# Patient Record
Sex: FEMALE | Race: WHITE | URBAN - METROPOLITAN AREA
[De-identification: names, ages, dates, MRNs, and addresses within clinical notes are randomized per-mention and may not be internally consistent; named-entity substitution may affect disease eponyms.]

---

## 2017-02-01 NOTE — PROGRESS NOTES
PRE-VISIT PLANNING:     Future Appointments  Date Time Provider Prasanna Yaz   2017 9:00 AM Kwabena Presley MD Kimberly Moreno is a patient of Kwabena Presley MD who is scheduled for an office visit with Kwabena Presley MD on 2017 for follow up on discoid lupus, prediabetes and high TGs (labs are due). Encounter opened prior to visit to complete pre-visit planning, update and review pertinent sections in the chart. Last office visit with PCP was 16. Rooming Nurse Items:  -- Offer flu shot    Pending items for provider to review with patient:  -- Update fasting labs  There are no preventive care reminders to display for this patient. Internal Medicine/Primary Care  Progress Note  [address] Garay at [address] , [address] [place] [address]  Phone [phone]  Fax [phone]    Date of Service:  2017  Patient's Name: Liam Loredo   Patient's :  1980     History, Discussion & Plan     Chief Complaint   Patient presents with    Medication Evaluation        Liam Loredo is a pleasant 40 y.o. female patient who was seen today for follow up visit. She  has a past medical history of Allergic rhinitis due to dogs; Allergic rhinitis due to pollen (2010); BMI 28.0-28.9,adult; Discoid lupus; Herpes zoster with ophthalmic complication; History of gestational hypertension (); Hypertriglyceridemia (); and Prediabetes. Discoid lupus is stable, usually better in the winter, worse with sun exposures. She is careful to apply sunblock even in the winter. She has had a few lesions on forearms and face, but all are healing at this point with use of topicals. Dr. Raya Sepulveda had recommended DUYEN screening every 6 months and derm follow up if DUYEN becomes positive. Labs on 16 with stable A1C at 5.8% and DUYEN negative. Fasting today to update labs.       Migraines are improved with Mirena, placed in Sept by Ob/Gyn at [facility]. Still has some bleeding cycles, alternating between spotting and 10 day bleeding cycles. Seeing Dr. Maryam Gee at [facility] for viral infection in right eye. Her kids (now 8, 5 and 3) have been sick repeatedly with the weather changes - mostly URI, one did have stomach bug. She hasn't been ill at all. Declines flu shot. Review of Systems (positives in bold)   CONST:   fevers, chills, rigors, malaise, night sweats, fatigue    unintentional weight change, appetite change  NEURO:   headaches, auras, vision changes, seizures,     dizziness, lightheadeness, orthostasis, loss of consciousness, confusion    numbness/tingling/sensory change, focal weakness, new gait difficulty/imbalance  HEENT:  itchy eyes, runny eyes, red eyes, eye watering or discharge,     vision changes, light sensitivity, double vision  CV:      chest pain, palpitations, chest wall pain, orthopnea, PND, edema  PULM:  SOB, wheezing, cough, sputum production, hemoptysis  GI:             nausea, vomiting, dry heaves, abdominal pain,     diarrhea, constipation, greasy stools, blood in stool, pale stools  :       dysuria, frequency, urgency, hematuria, incontinence, flank pain     decreased urine output, dark urine color, malodorous urine, kidney stones  MS:      focal muscle pain, myalgias, soft tissue swelling,    focal joint pain, diffuse joint aches, joint swelling/redness,     decreased ROM, joint instability, joint crepitus    neck pain, back pain  SKIN:        rashes, itching, vesicles, pustules, drainage, cuts or sores, nonhealing wounds,     acne, rosacea, new or changing skin growths, skin tags, warts    Body mass index is 28.96 kg/(m^2). Discussed the patient's above normal BMI with her. I have recommended the following interventions and follow up:  Weight loss from baseline weight.   30 minutes of cardiovascular exercise daily, reduction in simple carbohydrates, increase in dietary fiber, adequate water intake to stay hydrated/keep urine clear to light yellow. Follow up at next OV. Diagnoses & Orders:   Annamaria Sullivan was seen today for follow up. Update fasting labs. Monitor for worsening skin lesions, new joint aches/pains or swelling, breathing issues, urinary symptoms/change, leg swelling. Patient to let me know if she needs any topicals refilled for discoid lupus. Follow up in 6 months for 30 min visit, sooner PRN. ICD-10-CM ICD-9-CM    1. Discoid lupus L93.0 695.4 DUYEN QL, W/REFLEX CASCADE   2. Prediabetes R73.03 790.29 HEMOGLOBIN A1C WITH EAG   3. Hypertriglyceridemia E78.1 272.1 LIPID PANEL   4. Screening for endocrine, nutritional, metabolic and immunity disorder Z13.29 V77.99 DUYEN QL, W/REFLEX CASCADE    Z13.21  CBC WITH AUTOMATED DIFF    K37.533  METABOLIC PANEL, COMPREHENSIVE    Z13.0  HEMOGLOBIN A1C WITH EAG      LIPID PANEL      URINALYSIS W/ RFLX MICROSCOPIC      TSH AND FREE T4      VITAMIN D, 25 HYDROXY   5. Screening for diabetes mellitus Z13.1 V77.1 HEMOGLOBIN A1C WITH EAG   6. Screening for ischemic heart disease Z13.6 V81.0 LIPID PANEL   7. Screening for lipid disorders Z13.220 V77.91 LIPID PANEL   8. Screening for blood or protein in urine Z13.89 V82.9 URINALYSIS W/ RFLX MICROSCOPIC   9. Thyroid disorder screen Z13.29 V77.0 TSH AND FREE T4   10. Encounter for vitamin deficiency screening Z13.21 V77.99 VITAMIN D, 25 HYDROXY   11. Influenza vaccination declined by patient Z28.21 V64.06    12. Herpes zoster with ophthalmic complication, unspecified herpes zoster eye disease B02.30 053.29        The patient states understanding/agreement with the treatment plan. All questions were answered.       Ashley Guardado MD - Internal Medicine  2/2/2017, 5:52 PM    Patient Care Team:  Ashley Guardado MD as PCP - General (Internal Medicine)  [facility] as Consulting Provider (Obstetrics & Gynecology)  [facility] as Consulting Provider (Ophthalmology)  Cinthia Sam MD as Consulting Provider (Ophthalmology)      Objective:       VS:    Visit Vitals    /82    Pulse 84    Temp 98.6 °F (37 °C) (Oral)    Resp 16    Ht 5' 5\" (1.651 m)    Wt 174 lb (78.9 kg)    LMP 01/31/2017 (Exact Date)    SpO2 98%    BMI 28.96 kg/m2       General:   Age appropriate female, seated comfortably in exam room chair    Appears well, well-nourished, well-groomed, conversant, alert, in no acute distress. HEENT:  Normocephalic, atraumatic, MMM, PERRL, EOMI   Cardiovasc:   Regular rate and rhythm, no murmurs, no rubs, no gallops  Pulmonary:   Clear breath sounds bilaterally, good air movement,    No wheezing, no rales, no rhonchi, normal respiratory effort  Abdomen:   Abdomen soft, nontender, nondistended, NABS, no masses  Extremities:   No pitting dependent edema    No tenderness with palpation of calves    Warm and well-perfused at distal extremities  Neuro:   Alert, conversant, following commands, no focal deficits.      Gait is narrow based and stable without assistive device  Skin:    Erythematous plaque without ulceration or drainage dorsal hand and nummular areas on face and forearms bilaterally with mild erythema, slightly raised, no excoriation  Psych:  Affect is bright and interactive, facies and mood are congruent,     behavior normal and appropriate, thought process linear and logical     Memory appears to be normal throughout interview      Additional History     Past Medical History   Diagnosis Date    Allergic rhinitis due to dogs     Allergic rhinitis due to pollen 6/28/2010     Seen by ENT, using OTC claritin and nasal steroid spray PRN    BMI 28.0-28.9,adult     Discoid lupus      Dr[facility] Benigno [facility], skin on face, chest, scalp and arms - blood tests every 6 months    Herpes zoster with ophthalmic complication      Followed by Dr. Nestor Sanderson and referred to [facility] for corneal scarring    History of gestational hypertension 2013    Hypertriglyceridemia 2015    Prediabetes      HbA1C 5.9% (2/2016)     No past surgical history on file. Social History   Substance Use Topics    Smoking status: Never Smoker    Smokeless tobacco: Never Used    Alcohol use No     Current Outpatient Prescriptions   Medication Sig    valACYclovir (VALTREX) 500 mg tablet Take 1,000 mg by mouth three (3) times daily.  ganciclovir 0.15% (ZIRGAN) 0.15 % gel 1 Drop.  Difluprednate (DUREZOL) 0.05 % ophthalmic emulsion Administer 1 Drop to both eyes.  levonorgestrel (MIRENA) 20 mcg/24 hr (5 years) IUD 1 Each by IntraUTERine route once. Placed 9/1/16    ketoconazole (NIZORAL) 2 % topical cream Thin layer to affected areas on toes twice daily until skin clears. Then use antiperspirant.  clobetasol (TEMOVATE) 0.05 % ointment Apply  to affected area two (2) times a day.  mometasone (NASONEX) 50 mcg/actuation nasal spray 2 Sprays by Both Nostrils route daily. No current facility-administered medications for this visit. Allergies   Allergen Reactions    Sulfa (Sulfonamide Antibiotics) Other (comments)            This document may have been created with the aid of dictation software. In spite of review and editing, text may contain errors, particularly phonetic errors.

## 2017-02-02 ENCOUNTER — OFFICE VISIT (OUTPATIENT)
Dept: FAMILY MEDICINE CLINIC | Age: 37
End: 2017-02-02

## 2017-02-02 VITALS
SYSTOLIC BLOOD PRESSURE: 110 MMHG | TEMPERATURE: 98.6 F | RESPIRATION RATE: 16 BRPM | HEIGHT: 65 IN | HEART RATE: 84 BPM | WEIGHT: 174 LBS | BODY MASS INDEX: 28.99 KG/M2 | DIASTOLIC BLOOD PRESSURE: 82 MMHG | OXYGEN SATURATION: 98 %

## 2017-02-02 DIAGNOSIS — R73.03 PREDIABETES: Chronic | ICD-10-CM

## 2017-02-02 DIAGNOSIS — Z13.228 SCREENING FOR ENDOCRINE, NUTRITIONAL, METABOLIC AND IMMUNITY DISORDER: ICD-10-CM

## 2017-02-02 DIAGNOSIS — Z13.0 SCREENING FOR ENDOCRINE, NUTRITIONAL, METABOLIC AND IMMUNITY DISORDER: ICD-10-CM

## 2017-02-02 DIAGNOSIS — Z13.220 SCREENING FOR LIPID DISORDERS: ICD-10-CM

## 2017-02-02 DIAGNOSIS — L93.0 DISCOID LUPUS: Primary | Chronic | ICD-10-CM

## 2017-02-02 DIAGNOSIS — B02.30 HERPES ZOSTER WITH OPHTHALMIC COMPLICATION, UNSPECIFIED HERPES ZOSTER EYE DISEASE: ICD-10-CM

## 2017-02-02 DIAGNOSIS — Z13.21 ENCOUNTER FOR VITAMIN DEFICIENCY SCREENING: ICD-10-CM

## 2017-02-02 DIAGNOSIS — Z13.21 SCREENING FOR ENDOCRINE, NUTRITIONAL, METABOLIC AND IMMUNITY DISORDER: ICD-10-CM

## 2017-02-02 DIAGNOSIS — Z13.89 SCREENING FOR BLOOD OR PROTEIN IN URINE: ICD-10-CM

## 2017-02-02 DIAGNOSIS — E78.1 HYPERTRIGLYCERIDEMIA: Chronic | ICD-10-CM

## 2017-02-02 DIAGNOSIS — Z13.1 SCREENING FOR DIABETES MELLITUS: ICD-10-CM

## 2017-02-02 DIAGNOSIS — Z13.29 SCREENING FOR ENDOCRINE, NUTRITIONAL, METABOLIC AND IMMUNITY DISORDER: ICD-10-CM

## 2017-02-02 DIAGNOSIS — Z28.21 INFLUENZA VACCINATION DECLINED BY PATIENT: ICD-10-CM

## 2017-02-02 DIAGNOSIS — Z13.29 THYROID DISORDER SCREEN: ICD-10-CM

## 2017-02-02 DIAGNOSIS — Z13.6 SCREENING FOR ISCHEMIC HEART DISEASE: ICD-10-CM

## 2017-02-02 RX ORDER — VALACYCLOVIR HYDROCHLORIDE 500 MG/1
1000 TABLET, FILM COATED ORAL 3 TIMES DAILY
COMMUNITY

## 2017-02-02 RX ORDER — DIFLUPREDNATE 0.5 MG/ML
1 EMULSION OPHTHALMIC
COMMUNITY
End: 2017-07-13

## 2017-02-02 NOTE — PROGRESS NOTES
Liam Loredo is a 40 y.o. female  Pt here today for follow up visit. 1. Have you been to the ER, urgent care clinic since your last visit? Hospitalized since your last visit? No    2. Have you seen or consulted any other health care providers outside of the [address] [place] [address] since your last visit? Include any pap smears or colon screening.  Yes Where: OB/GYN

## 2017-02-02 NOTE — MR AVS SNAPSHOT
Visit Information Date & Time Provider Department Dept. Phone Encounter #  
 2/2/2017  9:00 AM Melinda Silva, [place] [phone] [id number] Follow-up Instructions Return in about 6 months (around 8/2/2017) for 30 min visit. Upcoming Health Maintenance Date Due  
 PAP AKA CERVICAL CYTOLOGY 6/5/2017 DTaP/Tdap/Td series (2 - Td) 4/14/2021 Allergies as of 2/2/2017  Review Complete On: 2/2/2017 By: Melinda Silva MD  
  
 Severity Noted Reaction Type Reactions Sulfa (Sulfonamide Antibiotics)  06/24/2010    Other (comments) Current Immunizations  Reviewed on 2/1/2016 Name Date Tdap 4/14/2011 Not reviewed this visit You Were Diagnosed With   
  
 Codes Comments Screening for endocrine, nutritional, metabolic and immunity disorder    -  Primary ICD-10-CM: Z13.29, Z13.21, Z13.228, Z13.0 ICD-9-CM: V77.99 Screening for diabetes mellitus     ICD-10-CM: Z13.1 ICD-9-CM: V77.1 Screening for ischemic heart disease     ICD-10-CM: Z13.6 ICD-9-CM: V81.0 Screening for lipid disorders     ICD-10-CM: Z13.220 ICD-9-CM: V77.91 Screening for blood or protein in urine     ICD-10-CM: Z13.89 ICD-9-CM: V82.9 Thyroid disorder screen     ICD-10-CM: Z13.29 ICD-9-CM: V77.0 Encounter for vitamin deficiency screening     ICD-10-CM: Z13.21 ICD-9-CM: V77.99 Prediabetes     ICD-10-CM: R73.03 
ICD-9-CM: 790.29 Discoid lupus     ICD-10-CM: L93.0 ICD-9-CM: 695.4 Hypertriglyceridemia     ICD-10-CM: E78.1 ICD-9-CM: 272.1 Vitals BP Pulse Temp Resp Height(growth percentile) Weight(growth percentile) 110/82 84 98.6 °F (37 °C) (Oral) 16 5' 5\" (1.651 m) 174 lb (78.9 kg) LMP SpO2 BMI OB Status Smoking Status 01/31/2017 (Exact Date) 98% 28.96 kg/m2 Having regular periods Never Smoker Vitals History BMI and BSA Data  Body Mass Index Body Surface Area  
 28.96 kg/m 2 1.9 m 2  
  
  
 Preferred Pharmacy Pharmacy Name Phone [facility] [address] [phone] Your Updated Medication List  
  
   
This list is accurate as of: 2/2/17  9:05 AM.  Always use your most recent med list.  
  
  
  
  
 clobetasol 0.05 % ointment Commonly known as:  Julian Sinks Apply  to affected area two (2) times a day. DUREZOL 0.05 % ophthalmic emulsion Generic drug:  Difluprednate Administer 1 Drop to both eyes. ketoconazole 2 % topical cream  
Commonly known as:  NIZORAL Thin layer to affected areas on toes twice daily until skin clears. Then use antiperspirant. MIRENA 20 mcg/24 hr (5 years) IUD Generic drug:  levonorgestrel 1 Each by IntraUTERine route once. Placed 9/1/16  
  
 mometasone 50 mcg/actuation nasal spray Commonly known as:  NASONEX  
2 Sprays by Both Nostrils route daily. VALTREX 500 mg tablet Generic drug:  valACYclovir Take 1,000 mg by mouth three (3) times daily. ZIRGAN 0.15 % Gel Generic drug:  ganciclovir 0.15% 1 Drop. Follow-up Instructions Return in about 6 months (around 8/2/2017) for 30 min visit. To-Do List   
 02/02/2017 Lab:  DUYEN QL, W/REFLEX CASCADE   
  
 02/02/2017 Lab:  CBC WITH AUTOMATED DIFF   
  
 02/02/2017 Lab:  HEMOGLOBIN A1C WITH EAG   
  
 02/02/2017 Lab:  LIPID PANEL   
  
 02/02/2017 Lab:  METABOLIC PANEL, COMPREHENSIVE   
  
 02/02/2017 Lab:  TSH AND FREE T4   
  
 02/02/2017 Lab:  URINALYSIS W/ RFLX MICROSCOPIC   
  
 02/02/2017 Lab:  VITAMIN D, 25 HYDROXY Patient Instructions STAY UP TO DATE WITH YOUR PREVENTIVE HEALTH:    
Please review the following recommendations and if you are not sure that your healthcare is up to date, ask your provider at your next scheduled office visit.   
 
-- Yearly preventive visits (sometimes called \"physicals\") are recommended for all adults. Medicare and Medicaid do not pay for physicals. Medicare covers a yearly wellness visit that differs in many ways from a traditional physical, but is an opportunity to make sure you are maximizing the preventive services that will be covered by Medicare. Each insurance carrier will have different regulations about what services may be covered, so be sure to talk with your insurance provider before scheduling a visit. -- Colon cancer screening is recommended for all patients 48 and older with either colonoscopy (interval will vary depending on results) or yearly stool testing.   
 
-- Mammogram is recommended every 2 years for women ages 36 to 76. -- Pap smear is recommended every 3-5 years for women aged 24 to 72, unless your cervix has been surgically removed for benign reasons. -- Flu shot is recommended every fall for adults of all ages. -- Prevnar 15 is recommended at the age of 72 for all adults. -- Pneumovax is recommended one year after Prevnar 13 for all adults, but earlier if you have a history of chronic health problems (including diabetes and asthma) or smoking. -- Tetanus boosters are recommended every 10 years for adults of all ages. Medicare and Medicaid do not cover tetanus as a preventive booster, but will pay for patients to receive a booster in the event of certain injuries. INSTRUCTIONS AFTER YOUR VISIT ON 2/2/2017 LAB WORK was ordered for today. Sign in for the lab at the . Results are generally reviewed within 10-14 days. LAB RESULTS can be obtained by logging into your [employer] account. If you need assistance with accessing your account, you can call the [address] [place] [address] at #[phone]. TESTING RESULTS  
-- Lab results may become available for your review on [employer] before your provider has an opportunity to write you a note about results.   Review of routine lab results will generally be done in 10-14 days. Please save your inquiries about results until your provider has had time to review them. -- If you have testing done outside of the office, please call to let us know the date and location that your testing was completed. Results can often be obtained faster if an inquiry is run. MEDICATION REFILLS   
 
-- Controlled substance refills (medications that require printed prescriptions for monitoring of use per [facility] guidelines) must be picked up in the office and per medical group policy will require a scheduled office visit with the provider. Calling the after hours answering service to request a controlled substance represents a violation of [facility] controlled substance policy. -- All other medication refills are to be requested by calling your pharmacy during regular office hours. The after hours physician on call is not required to respond to calls about medication refills. It is your responsibility to keep track of when you may be running out of medication and to place refill requests at least 3 business days prior. [facility] Scheduling appointments can be done at the  or by calling [phone] and selecting option #1. HOLIDAY CLOSURES:  
 
The office is closed on six major holidays each year:  New Year's Day, July 4th, Memorial Day, Labor Day, Thanksgiving, and Ani Day. Lab and X-ray services are not available on those days. Introducing [facility]! Kay Veloz introduces [employer] patient portal. Now you can access parts of your medical record, email your doctor's office, and request medication refills online. 1. In your internet browser, go to [email or web address] 2. Click on the First Time User? Click Here link in the Sign In box. You will see the New Member Sign Up page. 3. Enter your [employer] Access Code exactly as it appears below. You will not need to use this code after youve completed the sign-up process. If you do not sign up before the expiration date, you must request a new code. · [employer] Access Code: [email or web address] Expires: 5/3/2017  9:05 AM 
 
4. Enter the last four digits of your Social Security Number (xxxx) and Date of Birth (mm/dd/yyyy) as indicated and click Submit. You will be taken to the next sign-up page. 5. Create a Openfolio ID. This will be your [employer] login ID and cannot be changed, so think of one that is secure and easy to remember. 6. Create a [employer] password. You can change your password at any time. 7. Enter your Password Reset Question and Answer. This can be used at a later time if you forget your password. 8. Enter your e-mail address. You will receive e-mail notification when new information is available in [address] 9. Click Sign Up. You can now view and download portions of your medical record. 10. Click the Download Summary menu link to download a portable copy of your medical information. If you have questions, please visit the Frequently Asked Questions section of the [employer] website. Remember, [employer] is NOT to be used for urgent needs. For medical emergencies, dial 911. Now available from your iPhone and Android! Please provide this summary of care documentation to your next provider. Your primary care clinician is listed as Alber Dominguez. If you have any questions after today's visit, please call [phone].

## 2017-02-02 NOTE — LETTER
2/15/2017 10:39 AM 
 
Ms. Yuly Black 
[address] [address] Dear Yuly Black: 
 
Please find your most recent results below. Resulted Orders CBC WITH AUTOMATED DIFF Result Value Ref Range WBC 4.6 4.0 - 11.0 K/uL  
 RBC 4.55 3.80 - 5.20 M/uL  
 HGB 13.8 11.7 - 15.5 g/dL HCT 40.7 35.1 - 46.5 % MCV 90 80 - 95 fL  
 MCH 30 26 - 34 pg MCHC 34 32 - 36 g/dL  
 RDW 13.9 10.0 - 16.0 % PLATELET 805 688 - 765 K/uL MPV 10.2 6.0 - 10.8 fL  
 NEUTROPHILS 42 40 - 75 % Lymphocytes 41 27 - 45 % MONOCYTES 14 (H) 3 - 9 % EOSINOPHILS 3 0 - 6 % BASOPHILS 1 0 - 2 %  
 ABS. NEUTROPHILS 1.9 1.8 - 7.7 K/uL ABSOLUTE LYMPHOCYTE COUNT 1.9 1.0 - 4.8 K/uL  
 ABS. MONOCYTES 0.6 0.1 - 0.9 K/uL  
 ABS. EOSINOPHILS 0.1 0.0 - 0.5 K/uL  
 ABS. BASOPHILS 0.0 0.0 - 0.2 K/uL Narrative Unless additionally indicated, test performed at: [facility] [address] [facility], [address] [place] [address], [address] [place] [address]. PH: [phone]. METABOLIC PANEL, COMPREHENSIVE Result Value Ref Range Glucose 99 65 - 99 mg/dL BUN 9 6 - 22 mg/dL Creatinine 0.5 0.5 - 1.2 mg/dL Sodium 139 133 - 145 mmol/L Potassium 3.8 3.5 - 5.5 mmol/L Chloride 101 98 - 110 mmol/L  
 CO2 23 20 - 32 mmol/L  
 AST (SGOT) 9 (L) 10 - 37 U/L  
 ALT (SGPT) 18 5 - 40 U/L Alk. phosphatase 62 25 - 115 U/L Bilirubin, total 0.4 0.2 - 1.2 mg/dL Calcium 8.7 8.4 - 10.5 mg/dL Protein, total 7.4 6.4 - 8.3 g/dL Albumin 4.7 3.5 - 5.0 g/dL A-G Ratio 1.7 1.1 - 2.6 ratio Globulin 2.7 2.0 - 4.0 g/dL Anion gap 15.0 mmol/L Comment:  
   Test includes Albumin, Alkaline Phosphatase, ALT, AST, BUN, Calcium, CO2, Chloride, Creatinine, Glucose, Potassium, Sodium, Total Bilirubin and Total 
Protein. Estimated GFR results are reported in mL/min/1.73 sq.m. by the MDRD equation. This eGFR is validated for stable chronic renal failure patients. This  
equation is unreliable in acute illness or patients with normal renal function. GFRAA >60.0 >60.0 GFRNA >60.0 >60.0 Narrative Unless additionally indicated, test performed at: [address], [address] [place] [address]. PH: [phone]. LIPID PANEL Result Value Ref Range Triglyceride 146 40 - 149 mg/dL HDL Cholesterol 37 (L) 40 - 59 mg/dL Cholesterol, total 156 110 - 200 mg/dL LDL, calculated 90 50 - 99 mg/dL VLDL, calculated 29 8 - 30 mg/dL Comment:  
   Test includes cholesterol, HDL cholesterol, triglycerides and LDL. Cholesterol Recommended NCEP guidelines in mg/dL: 
Less than 200      Desirable 200 - 239          Borderline High Greater than or  = to 240   High 
  
 Narrative Unless additionally indicated, test performed at: [address], [address] [place] [address]. PH: [phone]. VITAMIN D, 25 HYDROXY Result Value Ref Range VITAMIN D, 25-HYDROXY 24.7 (L) 32.0 - 100.0 ng/mL Narrative Unless additionally indicated, test performed at: [address], [address] [place] [address]. PH: [phone]. T4, FREE Result Value Ref Range T4, Free 1.3 0.9 - 1.8 ng/dL Narrative Unless additionally indicated, test performed at: [address], [address] [place] [address]. PH: [phone]. DUYEN BY MULTIPLEX FLOW IA, QL Result Value Ref Range DUYEN SCREEN Negative Negative Narrative Unless additionally indicated, test performed at: [address], [address] [place] [address]. PH: [phone]. TSH, 3RD GENERATION Result Value Ref Range TSH 1.25 0.27 - 4.20 mcU/mL Narrative Unless additionally indicated, test performed at: [address], [address] [place] [address]. PH: [phone]. HEMOGLOBIN A1C W/O EAGSTIMATION Result Value Ref Range Hemoglobin A1c 5.8 4.8 - 5.9 % AVG  91 - 123 mg/dL Narrative Unless additionally indicated, test performed at: [email or web address], [address] [place] [address]. PH: [phone]. URINALYSIS W/MICROSCOPIC Result Value Ref Range Specific Gravity 1.024 1.005 - 1.03  
 pH (UA) 5.0 5.0 - 8.0 pH Protein Negative Negative, mg/dL Glucose Negative Negative mg/dL Ketone Negative Negative mg/dL Bilirubin Negative Negative Blood Negative Negative Nitrites Negative Negative Leukocyte Esterase Negative Negative Urobilinogen <2.0 <2.0 mg/dL WBC 0-2 0 - 2 /hpf  
 RBC Negative Negative, /hpf Epithelial cells 0-2 0 - 2 /hpf Narrative Unless additionally indicated, test performed at: [facility] [address] [facility], [address] [place] [address]. PH: [phone]. RECOMMENDATIONS per Dr. Armand Reid: 
Theadore Deshpande show normal blood counts, normal kidney testing, normal electrolytes, normal liver markers, normal fasting blood sugar. Cholesterol levels show normal LDL \"bad\" cholesterol and slightly low HDL \"good\" cholesterol.  This is similar to previous check.  Your triglyceride level however is significantly improved and is now in the normal range.    
Vitamin D level was low at 24.7, weekly high dose supplement is recommended. Thyroid testing was normal.  
DUYEN was negative. Hemoglobin A1C was stable at 5.8%, in early prediabetic range.    
Urine screening is normal  
 
Please call me if you have any questions: [phone] Sincerely, 
 
 
 
Nurse Erich Lennon

## 2017-02-02 NOTE — PATIENT INSTRUCTIONS
STAY UP TO DATE WITH YOUR PREVENTIVE HEALTH:     Please review the following recommendations and if you are not sure that your healthcare is up to date, ask your provider at your next scheduled office visit. -- Yearly preventive visits (sometimes called \"physicals\") are recommended for all adults. Medicare and Medicaid do not pay for physicals. Medicare covers a yearly wellness visit that differs in many ways from a traditional physical, but is an opportunity to make sure you are maximizing the preventive services that will be covered by Medicare. Each insurance carrier will have different regulations about what services may be covered, so be sure to talk with your insurance provider before scheduling a visit. -- Colon cancer screening is recommended for all patients 48 and older with either colonoscopy (interval will vary depending on results) or yearly stool testing.      -- Mammogram is recommended every 2 years for women ages 36 to 76. -- Pap smear is recommended every 3-5 years for women aged 24 to 72, unless your cervix has been surgically removed for benign reasons. -- Flu shot is recommended every fall for adults of all ages. -- Prevnar 15 is recommended at the age of 72 for all adults. -- Pneumovax is recommended one year after Prevnar 13 for all adults, but earlier if you have a history of chronic health problems (including diabetes and asthma) or smoking. -- Tetanus boosters are recommended every 10 years for adults of all ages. Medicare and Medicaid do not cover tetanus as a preventive booster, but will pay for patients to receive a booster in the event of certain injuries. INSTRUCTIONS AFTER YOUR VISIT ON 2/2/2017     LAB WORK was ordered for today. Sign in for the lab at the . Results are generally reviewed within 10-14 days. LAB RESULTS can be obtained by logging into your myThings account.   If you need assistance with accessing your account, you can call the [address] [place] [address] at #[phone]. TESTING RESULTS   -- Lab results may become available for your review on [facility]elloYello before your provider has an opportunity to write you a note about results. Review of routine lab results will generally be done in 10-14 days. Please save your inquiries about results until your provider has had time to review them. -- If you have testing done outside of the office, please call to let us know the date and location that your testing was completed. Results can often be obtained faster if an inquiry is run. MEDICATION REFILLS      -- Controlled substance refills (medications that require printed prescriptions for monitoring of use per [facility] guidelines) must be picked up in the office and per medical group policy will require a scheduled office visit with the provider. Calling the after hours answering service to request a controlled substance represents a violation of [facility] controlled substance policy. -- All other medication refills are to be requested by calling your pharmacy during regular office hours. The after hours physician on call is not required to respond to calls about medication refills. It is your responsibility to keep track of when you may be running out of medication and to place refill requests at least 3 business days prior. [facility]      Scheduling appointments can be done at the  or by calling [phone] and selecting option #1. HOLIDAY CLOSURES:     The office is closed on six major holidays each year:  New Year's Day, July 4th, Memorial Day, Labor Day, Thanksgiving, and Ani Day. Lab and X-ray services are not available on those days.

## 2017-02-03 LAB
25(OH)D3 SERPL-MCNC: 24.7 NG/ML (ref 32–100)
A-G RATIO,AGRAT: 1.7 RATIO (ref 1.1–2.6)
ABSOLUTE LYMPHOCYTE COUNT, 10803: 1.9 K/UL (ref 1–4.8)
ALBUMIN SERPL-MCNC: 4.7 G/DL (ref 3.5–5)
ALP SERPL-CCNC: 62 U/L (ref 25–115)
ALT SERPL-CCNC: 18 U/L (ref 5–40)
ANA SCREEN, 8017: NEGATIVE
ANION GAP SERPL CALC-SCNC: 15 MMOL/L
AST SERPL W P-5'-P-CCNC: 9 U/L (ref 10–37)
AVG GLU, 10930: 121 MG/DL (ref 91–123)
BASOPHILS # BLD: 0 K/UL (ref 0–0.2)
BASOPHILS NFR BLD: 1 % (ref 0–2)
BILIRUB SERPL-MCNC: 0.4 MG/DL (ref 0.2–1.2)
BILIRUB UR QL: NEGATIVE
BUN SERPL-MCNC: 9 MG/DL (ref 6–22)
CALCIUM SERPL-MCNC: 8.7 MG/DL (ref 8.4–10.5)
CHLORIDE SERPL-SCNC: 101 MMOL/L (ref 98–110)
CHOLEST SERPL-MCNC: 156 MG/DL (ref 110–200)
CO2 SERPL-SCNC: 23 MMOL/L (ref 20–32)
CREAT SERPL-MCNC: 0.5 MG/DL (ref 0.5–1.2)
EOSINOPHIL # BLD: 0.1 K/UL (ref 0–0.5)
EOSINOPHIL NFR BLD: 3 % (ref 0–6)
EPITHELIAL,EPSU: NORMAL /HPF (ref 0–2)
ERYTHROCYTE [DISTWIDTH] IN BLOOD BY AUTOMATED COUNT: 13.9 % (ref 10–16)
GFRAA, 66117: >60
GFRNA, 66118: >60
GLOBULIN,GLOB: 2.7 G/DL (ref 2–4)
GLUCOSE SERPL-MCNC: 99 MG/DL (ref 65–99)
GLUCOSE UR QL: NEGATIVE MG/DL
GRANULOCYTES,GRANS: 42 % (ref 40–75)
HBA1C MFR BLD HPLC: 5.8 % (ref 4.8–5.9)
HCT VFR BLD AUTO: 40.7 % (ref 35.1–46.5)
HDLC SERPL-MCNC: 37 MG/DL (ref 40–59)
HGB BLD-MCNC: 13.8 G/DL (ref 11.7–15.5)
HGB UR QL STRIP: NEGATIVE
KETONES UR QL STRIP.AUTO: NEGATIVE MG/DL
LDLC SERPL CALC-MCNC: 90 MG/DL (ref 50–99)
LEUKOCYTE ESTERASE: NEGATIVE
LYMPHOCYTES, LYMLT: 41 % (ref 27–45)
MCH RBC QN AUTO: 30 PG (ref 26–34)
MCHC RBC AUTO-ENTMCNC: 34 G/DL (ref 32–36)
MCV RBC AUTO: 90 FL (ref 80–95)
MONOCYTES # BLD: 0.6 K/UL (ref 0.1–0.9)
MONOCYTES NFR BLD: 14 % (ref 3–9)
NEUTROPHILS # BLD AUTO: 1.9 K/UL (ref 1.8–7.7)
NITRITE UR QL STRIP.AUTO: NEGATIVE
PH UR STRIP: 5 PH (ref 5–8)
PLATELET # BLD AUTO: 247 K/UL (ref 140–440)
PMV BLD AUTO: 10.2 FL (ref 6–10.8)
POTASSIUM SERPL-SCNC: 3.8 MMOL/L (ref 3.5–5.5)
PROT SERPL-MCNC: 7.4 G/DL (ref 6.4–8.3)
PROT UR QL STRIP: NEGATIVE MG/DL
RBC # BLD AUTO: 4.55 M/UL (ref 3.8–5.2)
RBC #/AREA URNS HPF: NEGATIVE /HPF
SODIUM SERPL-SCNC: 139 MMOL/L (ref 133–145)
SP GR UR: 1.02 (ref 1–1.03)
T4 FREE SERPL-MCNC: 1.3 NG/DL (ref 0.9–1.8)
TRIGL SERPL-MCNC: 146 MG/DL (ref 40–149)
TSH SERPL DL<=0.005 MIU/L-ACNC: 1.25 MCU/ML (ref 0.27–4.2)
UROBILINOGEN UR STRIP-MCNC: <2 MG/DL
VLDLC SERPL CALC-MCNC: 29 MG/DL (ref 8–30)
WBC # BLD AUTO: 4.6 K/UL (ref 4–11)
WBC URNS QL MICRO: NORMAL /HPF (ref 0–2)

## 2017-02-15 RX ORDER — ERGOCALCIFEROL 1.25 MG/1
50000 CAPSULE ORAL
Qty: 12 CAP | Refills: 1 | Status: SHIPPED | OUTPATIENT
Start: 2017-02-15 | End: 2017-08-10 | Stop reason: SDUPTHER

## 2017-05-09 DIAGNOSIS — B35.3 TINEA PEDIS OF BOTH FEET: ICD-10-CM

## 2017-05-10 RX ORDER — KETOCONAZOLE 20 MG/G
CREAM TOPICAL
Qty: 30 G | Refills: 0 | Status: SHIPPED | OUTPATIENT
Start: 2017-05-10 | End: 2017-07-13

## 2017-07-13 ENCOUNTER — OFFICE VISIT (OUTPATIENT)
Dept: FAMILY MEDICINE CLINIC | Age: 37
End: 2017-07-13

## 2017-07-13 VITALS
DIASTOLIC BLOOD PRESSURE: 80 MMHG | WEIGHT: 182 LBS | HEIGHT: 65 IN | OXYGEN SATURATION: 100 % | SYSTOLIC BLOOD PRESSURE: 130 MMHG | RESPIRATION RATE: 20 BRPM | TEMPERATURE: 97.6 F | HEART RATE: 98 BPM | BODY MASS INDEX: 30.32 KG/M2

## 2017-07-13 DIAGNOSIS — J30.9 ALLERGIC RHINITIS, UNSPECIFIED ALLERGIC RHINITIS TRIGGER, UNSPECIFIED RHINITIS SEASONALITY: ICD-10-CM

## 2017-07-13 DIAGNOSIS — L93.0 DISCOID LUPUS: ICD-10-CM

## 2017-07-13 DIAGNOSIS — E55.9 VITAMIN D DEFICIENCY: ICD-10-CM

## 2017-07-13 DIAGNOSIS — R73.03 PRE-DIABETES: ICD-10-CM

## 2017-07-13 DIAGNOSIS — L93.0 DISCOID LUPUS: Primary | ICD-10-CM

## 2017-07-13 RX ORDER — MOMETASONE FUROATE 50 UG/1
2 SPRAY, METERED NASAL DAILY
Qty: 1 CONTAINER | Refills: 0 | Status: CANCELLED | OUTPATIENT
Start: 2017-07-13

## 2017-07-13 RX ORDER — CLOBETASOL PROPIONATE 0.5 MG/G
CREAM TOPICAL
Qty: 15 G | Refills: 2 | Status: SHIPPED | OUTPATIENT
Start: 2017-07-13

## 2017-07-13 RX ORDER — CLOBETASOL PROPIONATE 0.5 MG/G
OINTMENT TOPICAL 2 TIMES DAILY
Qty: 30 G | Refills: 5 | Status: CANCELLED | OUTPATIENT
Start: 2017-07-13

## 2017-07-13 RX ORDER — MOMETASONE FUROATE 50 UG/1
2 SPRAY, METERED NASAL DAILY
Qty: 1 CONTAINER | Refills: 5 | Status: SHIPPED | OUTPATIENT
Start: 2017-07-13

## 2017-07-13 NOTE — PROGRESS NOTES
Rubina Oliveira is a 40 y.o. female  1. Have you been to the ER, urgent care clinic since your last visit? Hospitalized since your last visit? No    2. Have you seen or consulted any other health care providers outside of the [address] [place] [address] since your last visit? Include any pap smears or colon screening.  No

## 2017-07-13 NOTE — PROGRESS NOTES
HISTORY OF PRESENT ILLNESS  Ariel Martino is a 40 y.o. female. HPI  Discoid lupus, stable, she wants refill on her Clobex, but she prefer cream  AR, stable, need refill on Nasonex    Vit D def, improving, she has been taking 5000 iu weekly for 5 months now  Pre diabetes, stable, last a1c 5.8, glucose 99, but she gained few lbs since last visit  Review of Systems   Constitutional: Negative for chills and fever. HENT: Positive for congestion. Respiratory: Negative for cough and wheezing. Cardiovascular: Negative for chest pain. Gastrointestinal: Negative for abdominal pain and nausea. Musculoskeletal: Negative for joint pain and myalgias. Skin: Positive for rash. Physical Exam   Constitutional: She appears well-developed and well-nourished. HENT:   Right Ear: Tympanic membrane normal.   Left Ear: Tympanic membrane normal.   Nose: Mucosal edema present. Mouth/Throat: No oropharyngeal exudate. Cardiovascular: Normal rate, regular rhythm and normal heart sounds. Pulmonary/Chest: Effort normal and breath sounds normal.   Abdominal: Soft. There is no tenderness. Lymphadenopathy:     She has no cervical adenopathy. Skin: Rash (multiple round dry scaly erythematous patches on extr/trunck/face) noted. Vitals reviewed. ASSESSMENT and PLAN  Darron Muniz was seen today for lupus. Diagnoses and all orders for this visit:    Discoid lupus, stable  -     clobetasol (TEMOVATE) 0.05 % topical cream; Apply  to affected area two (2) times daily as needed. -     DUYEN QL, W/REFLEX CASCADE; Future    Allergic rhinitis, unspecified allergic rhinitis trigger, unspecified rhinitis seasonality, stable  -     mometasone (NASONEX) 50 mcg/actuation nasal spray; 2 Sprays by Both Nostrils route daily. Vitamin D deficiency, improving  -     VITAMIN D, 25 HYDROXY; Future    Pre-diabetes, stable, advised to exercise/lose weight  -     METABOLIC PANEL, COMPREHENSIVE;  Future  -     HEMOGLOBIN A1C WITH EAG; Future      Follow up in 6 mos with her pcp

## 2017-07-17 LAB
25(OH)D3 SERPL-MCNC: 33.7 NG/ML (ref 32–100)
A-G RATIO,AGRAT: 1.5 RATIO (ref 1.1–2.6)
ALBUMIN SERPL-MCNC: 4.3 G/DL (ref 3.5–5)
ALP SERPL-CCNC: 60 U/L (ref 25–115)
ALT SERPL-CCNC: 25 U/L (ref 5–40)
ANION GAP SERPL CALC-SCNC: 17 MMOL/L
AST SERPL W P-5'-P-CCNC: 10 U/L (ref 10–37)
AVG GLU, 10930: 133 MG/DL (ref 91–123)
BILIRUB SERPL-MCNC: 0.6 MG/DL (ref 0.2–1.2)
BUN SERPL-MCNC: 10 MG/DL (ref 6–22)
CALCIUM SERPL-MCNC: 8.9 MG/DL (ref 8.4–10.5)
CHLORIDE SERPL-SCNC: 96 MMOL/L (ref 98–110)
CO2 SERPL-SCNC: 23 MMOL/L (ref 20–32)
CREAT SERPL-MCNC: 0.5 MG/DL (ref 0.5–1.2)
GFRAA, 66117: >60
GFRNA, 66118: >60
GLOBULIN,GLOB: 2.8 G/DL (ref 2–4)
GLUCOSE SERPL-MCNC: 104 MG/DL (ref 65–99)
HBA1C MFR BLD HPLC: 6.2 % (ref 4.8–5.9)
MISCELLANEOUS TEST,99000: NORMAL
POTASSIUM SERPL-SCNC: 4.2 MMOL/L (ref 3.5–5.5)
PROT SERPL-MCNC: 7.1 G/DL (ref 6.4–8.3)
SENT TO, 434: NORMAL
SODIUM SERPL-SCNC: 136 MMOL/L (ref 133–145)
TEST NAME, 435: NORMAL

## 2017-07-19 NOTE — PROGRESS NOTES
Vit D normal, continue weekly vit D  DUYEN negative  A1c 6.2, worse than last labs, need to diet and lose weight

## 2017-11-08 ENCOUNTER — OFFICE VISIT (OUTPATIENT)
Dept: FAMILY MEDICINE CLINIC | Age: 37
End: 2017-11-08

## 2017-11-08 VITALS
SYSTOLIC BLOOD PRESSURE: 147 MMHG | WEIGHT: 185 LBS | HEART RATE: 108 BPM | RESPIRATION RATE: 18 BRPM | OXYGEN SATURATION: 100 % | HEIGHT: 65 IN | TEMPERATURE: 98.2 F | DIASTOLIC BLOOD PRESSURE: 82 MMHG | BODY MASS INDEX: 30.82 KG/M2

## 2017-11-08 DIAGNOSIS — J06.9 VIRAL UPPER RESPIRATORY TRACT INFECTION: ICD-10-CM

## 2017-11-08 DIAGNOSIS — J01.90 ACUTE NON-RECURRENT SINUSITIS, UNSPECIFIED LOCATION: ICD-10-CM

## 2017-11-08 DIAGNOSIS — Z23 ENCOUNTER FOR IMMUNIZATION: Primary | ICD-10-CM

## 2017-11-08 NOTE — PATIENT INSTRUCTIONS
Vaccine Information Statement    Influenza (Flu) Vaccine (Inactivated or Recombinant): What you need to know    Many Vaccine Information Statements are available in [place]iny[place] and other languages. See www.immunize.org/vis  Hojas de Información Sobre Vacunas están disponibles en Español y en muchos otros idiomas. Visite www.immunize.org/vis    1. Why get vaccinated? Influenza (flu) is a contagious disease that spreads around the [place] every year, usually between October and May. Flu is caused by influenza viruses, and is spread mainly by coughing, sneezing, and close contact. Anyone can get flu. Flu strikes suddenly and can last several days. Symptoms vary by age, but can include:   fever/chills   sore throat   muscle aches   fatigue   cough   headache    runny or stuffy nose    Flu can also lead to pneumonia and blood infections, and cause diarrhea and seizures in children. If you have a medical condition, such as heart or lung disease, flu can make it worse. Flu is more dangerous for some people. Infants and young children, people 72years of age and older, pregnant women, and people with certain health conditions or a weakened immune system are at greatest risk. Each year thousands of people in the [facility] die from flu, and many more are hospitalized. Flu vaccine can:   keep you from getting flu,   make flu less severe if you do get it, and   keep you from spreading flu to your family and other people. 2. Inactivated and recombinant flu vaccines    A dose of flu vaccine is recommended every flu season. Children 6 months through 6years of age may need two doses during the same flu season. Everyone else needs only one dose each flu season.        Some inactivated flu vaccines contain a very small amount of a mercury-based preservative called thimerosal. Studies have not shown thimerosal in vaccines to be harmful, but flu vaccines that do not contain thimerosal are available. There is no live flu virus in flu shots. They cannot cause the flu. There are many flu viruses, and they are always changing. Each year a new flu vaccine is made to protect against three or four viruses that are likely to cause disease in the upcoming flu season. But even when the vaccine doesnt exactly match these viruses, it may still provide some protection    Flu vaccine cannot prevent:   flu that is caused by a virus not covered by the vaccine, or   illnesses that look like flu but are not. It takes about 2 weeks for protection to develop after vaccination, and protection lasts through the flu season. 3. Some people should not get this vaccine    Tell the person who is giving you the vaccine:     If you have any severe, life-threatening allergies. If you ever had a life-threatening allergic reaction after a dose of flu vaccine, or have a severe allergy to any part of this vaccine, you may be advised not to get vaccinated. Most, but not all, types of flu vaccine contain a small amount of egg protein.  If you ever had Guillain-Barré Syndrome (also called GBS). Some people with a history of GBS should not get this vaccine. This should be discussed with your doctor.  If you are not feeling well. It is usually okay to get flu vaccine when you have a mild illness, but you might be asked to come back when you feel better. 4. Risks of a vaccine reaction    With any medicine, including vaccines, there is a chance of reactions. These are usually mild and go away on their own, but serious reactions are also possible. Most people who get a flu shot do not have any problems with it.      Minor problems following a flu shot include:    soreness, redness, or swelling where the shot was given     hoarseness   sore, red or itchy eyes   cough   fever   aches   headache   itching   fatigue  If these problems occur, they usually begin soon after the shot and last 1 or 2 days. More serious problems following a flu shot can include the following:     There may be a small increased risk of Guillain-Barré Syndrome (GBS) after inactivated flu vaccine. This risk has been estimated at 1 or 2 additional cases per million people vaccinated. This is much lower than the risk of severe complications from flu, which can be prevented by flu vaccine.  Young children who get the flu shot along with pneumococcal vaccine (PCV13) and/or DTaP vaccine at the same time might be slightly more likely to have a seizure caused by fever. Ask your doctor for more information. Tell your doctor if a child who is getting flu vaccine has ever had a seizure. Problems that could happen after any injected vaccine:      People sometimes faint after a medical procedure, including vaccination. Sitting or lying down for about 15 minutes can help prevent fainting, and injuries caused by a fall. Tell your doctor if you feel dizzy, or have vision changes or ringing in the ears.  Some people get severe pain in the shoulder and have difficulty moving the arm where a shot was given. This happens very rarely.  Any medication can cause a severe allergic reaction. Such reactions from a vaccine are very rare, estimated at about 1 in a million doses, and would happen within a few minutes to a few hours after the vaccination. As with any medicine, there is a very remote chance of a vaccine causing a serious injury or death. The safety of vaccines is always being monitored. For more information, visit: www.cdc.gov/vaccinesafety/    5. What if there is a serious reaction? What should I look for?  Look for anything that concerns you, such as signs of a severe allergic reaction, very high fever, or unusual behavior.     Signs of a severe allergic reaction can include hives, swelling of the face and throat, difficulty breathing, a fast heartbeat, dizziness, and weakness - usually within a few minutes to a few hours after the vaccination. What should I do?  If you think it is a severe allergic reaction or other emergency that cant wait, call 9-1-1 and get the person to the nearest hospital. Otherwise, call your doctor.  Reactions should be reported to the Vaccine Adverse Event Reporting System (VAERS). Your doctor should file this report, or you can do it yourself through  the VAERS web site at www.vaers. [facility].gov, or by calling 9-519.417.6146. VAERS does not give medical advice. 6. The National Vaccine Injury Compensation Program    The [facility] Vaccine Injury Compensation Program (VICP) is a federal program that was created to compensate people who may have been injured by certain vaccines. Persons who believe they may have been injured by a vaccine can learn about the program and about filing a claim by calling 6-180.710.2392 or visiting the [employer] website at www.[facility].gov/vaccinecompensation. There is a time limit to file a claim for compensation. 7. How can I learn more?  Ask your healthcare provider. He or she can give you the vaccine package insert or suggest other sources of information.  Call your local or state health department.  Contact the Centers for Disease Control and Prevention (CDC):  - Call 4-271.373.4312 (1-800-CDC-INFO) or  - Visit CDCs website at www.cdc.gov/flu    Vaccine Information Statement   Inactivated Influenza Vaccine   8/7/2015  [address] [id number]-[id number]    Department of Health and Human Services  Centers for Disease Control and Prevention    Office Use Only       Upper Respiratory Infection (Cold): Care Instructions  Your Care Instructions    An upper respiratory infection, or URI, is an infection of the nose, sinuses, or throat. URIs are spread by coughs, sneezes, and direct contact. The common cold is the most frequent kind of URI. The flu and sinus infections are other kinds of URIs. Almost all URIs are caused by viruses.  Antibiotics won't cure them. But you can treat most infections with home care. This may include drinking lots of fluids and taking over-the-counter pain medicine. You will probably feel better in 4 to 10 days. The doctor has checked you carefully, but problems can develop later. If you notice any problems or new symptoms, get medical treatment right away. Follow-up care is a key part of your treatment and safety. Be sure to make and go to all appointments, and call your doctor if you are having problems. It's also a good idea to know your test results and keep a list of the medicines you take. How can you care for yourself at home? · To prevent dehydration, drink plenty of fluids, enough so that your urine is light yellow or clear like water. Choose water and other caffeine-free clear liquids until you feel better. If you have kidney, heart, or liver disease and have to limit fluids, talk with your doctor before you increase the amount of fluids you drink. · Take an over-the-counter pain medicine, such as acetaminophen (Tylenol), ibuprofen (Advil, Motrin), or naproxen (Aleve). Read and follow all instructions on the label. · Before you use cough and cold medicines, check the label. These medicines may not be safe for young children or for people with certain health problems. · Be careful when taking over-the-counter cold or flu medicines and Tylenol at the same time. Many of these medicines have acetaminophen, which is Tylenol. Read the labels to make sure that you are not taking more than the recommended dose. Too much acetaminophen (Tylenol) can be harmful. · Get plenty of rest.  · Do not smoke or allow others to smoke around you. If you need help quitting, talk to your doctor about stop-smoking programs and medicines. These can increase your chances of quitting for good. When should you call for help? Call 911 anytime you think you may need emergency care. For example, call if:  ? · You have severe trouble breathing.    ?Call your doctor now or seek immediate medical care if:  ? · You seem to be getting much sicker. ? · You have new or worse trouble breathing. ? · You have a new or higher fever. ? · You have a new rash. ? Watch closely for changes in your health, and be sure to contact your doctor if:  ? · You have a new symptom, such as a sore throat, an earache, or sinus pain. ? · You cough more deeply or more often, especially if you notice more mucus or a change in the color of your mucus. ? · You do not get better as expected. Where can you learn more? Go to http://marky-jeovanny.info/. Enter X246 in the search box to learn more about \"Upper Respiratory Infection (Cold): Care Instructions. \"  Current as of: May 12, 2017  Content Version: 11.4  © 0241-0797 [employer]. Care instructions adapted under license by [employer] (which disclaims liability or warranty for this information). If you have questions about a medical condition or this instruction, always ask your healthcare professional. [address] any warranty or liability for your use of this information.

## 2017-11-08 NOTE — MR AVS SNAPSHOT
Visit Information Date & Time Provider Department Dept. Phone Encounter #  
 11/8/2017  3:30 PM Chu Shepherd [facility] [phone] [id number] Follow-up Instructions Return if symptoms worsen or fail to improve. Your Appointments 1/9/2018  9:30 AM  
Follow Up with Brittani Shannon MD  
[employer] [facility]) Appt Note: 6 month f/u  
 [address] [facility] [address] [facility] [id number] [phone]  
  
   
 [address] [facility] [place] Upcoming Health Maintenance Date Due  
 PAP AKA CERVICAL CYTOLOGY 6/5/2017 Influenza Age 5 to Adult 8/1/2017 DTaP/Tdap/Td series (2 - Td) 4/14/2021 Allergies as of 11/8/2017  Review Complete On: 11/8/2017 By: Chu Shepherd NP Severity Noted Reaction Type Reactions Sulfa (Sulfonamide Antibiotics)  06/24/2010    Other (comments) Current Immunizations  Reviewed on 11/8/2017 Name Date Influenza Vaccine (Quad) PF 11/8/2017  3:33 PM  
 Tdap 4/14/2011 Reviewed by Deacon Tomas LPN on 68/5/8918 at  3:33 PM  
You Were Diagnosed With   
  
 Codes Comments Encounter for immunization    -  Primary ICD-10-CM: C95 ICD-9-CM: V03.89 Acute non-recurrent sinusitis, unspecified location     ICD-10-CM: J01.90 ICD-9-CM: 461.9 Viral upper respiratory tract infection     ICD-10-CM: J06.9, B97.89 ICD-9-CM: 465.9 Vitals BP Pulse Temp Resp Height(growth percentile) Weight(growth percentile) 147/82 (BP 1 Location: Left arm, BP Patient Position: Sitting) (!) 108 98.2 °F (36.8 °C) (Oral) 18 5' 5\" (1.651 m) 185 lb (83.9 kg) LMP SpO2 BMI OB Status Smoking Status 10/18/2017 100% 30.79 kg/m2 Having regular periods Never Smoker BMI and BSA Data Body Mass Index Body Surface Area 30.79 kg/m 2 1.96 m 2 Preferred Pharmacy Pharmacy Name Phone [facility] [phone] Your Updated Medication List  
  
   
This list is accurate as of: 11/8/17  4:18 PM.  Always use your most recent med list.  
  
  
  
  
 clobetasol 0.05 % topical cream  
Commonly known as:  Alvester Car Apply  to affected area two (2) times daily as needed. ergocalciferol 50,000 unit capsule Commonly known as:  ERGOCALCIFEROL  
TAKE 1 CAP BY MOUTH EVERY SEVEN (7) DAYS  DAYS. MIRENA 20 mcg/24 hr (5 years) Iud  
Generic drug:  levonorgestrel 1 Each by IntraUTERine route once. Placed 9/1/16  
  
 mometasone 50 mcg/actuation nasal spray Commonly known as:  NASONEX  
2 Sprays by Both Nostrils route daily. VALTREX 500 mg tablet Generic drug:  valACYclovir Take 1,000 mg by mouth three (3) times daily. We Performed the Following INFLUENZA VIRUS VAC QUAD,SPLIT,PRESV FREE SYRINGE IM [id number] CPT(R)] MI IMMUNIZ ADMIN,1 SINGLE/COMB VAC/TOXOID [id number] CPT(R)] Follow-up Instructions Return if symptoms worsen or fail to improve. To-Do List   
 11/08/2017 Imaging:  XR SINUSES PARANASAL MIN 3 V Patient Instructions Vaccine Information Statement Influenza (Flu) Vaccine (Inactivated or Recombinant): What you need to know Many Vaccine Information Statements are available in Stateless and other languages. See www.immunize.org/vis Hojas de Información Sobre Vacunas están disponibles en Español y en muchos otros idiomas. Visite www.immunize.org/vis 1. Why get vaccinated? Influenza (flu) is a contagious disease that spreads around the [place] every year, usually between October and May. Flu is caused by influenza viruses, and is spread mainly by coughing, sneezing, and close contact. Anyone can get flu. Flu strikes suddenly and can last several days. Symptoms vary by age, but can include: 
 fever/chills  sore throat  muscle aches  fatigue  cough  headache  runny or stuffy nose Flu can also lead to pneumonia and blood infections, and cause diarrhea and seizures in children. If you have a medical condition, such as heart or lung disease, flu can make it worse. Flu is more dangerous for some people. Infants and young children, people 72years of age and older, pregnant women, and people with certain health conditions or a weakened immune system are at greatest risk. Each year thousands of people in the [facility] die from flu, and many more are hospitalized. Flu vaccine can: 
 keep you from getting flu, 
 make flu less severe if you do get it, and 
 keep you from spreading flu to your family and other people. 2. Inactivated and recombinant flu vaccines A dose of flu vaccine is recommended every flu season. Children 6 months through 6years of age may need two doses during the same flu season. Everyone else needs only one dose each flu season. Some inactivated flu vaccines contain a very small amount of a mercury-based preservative called thimerosal. Studies have not shown thimerosal in vaccines to be harmful, but flu vaccines that do not contain thimerosal are available. There is no live flu virus in flu shots. They cannot cause the flu. There are many flu viruses, and they are always changing. Each year a new flu vaccine is made to protect against three or four viruses that are likely to cause disease in the upcoming flu season. But even when the vaccine doesnt exactly match these viruses, it may still provide some protection Flu vaccine cannot prevent: 
 flu that is caused by a virus not covered by the vaccine, or 
 illnesses that look like flu but are not. It takes about 2 weeks for protection to develop after vaccination, and protection lasts through the flu season. 3. Some people should not get this vaccine Tell the person who is giving you the vaccine:  If you have any severe, life-threatening allergies. If you ever had a life-threatening allergic reaction after a dose of flu vaccine, or have a severe allergy to any part of this vaccine, you may be advised not to get vaccinated. Most, but not all, types of flu vaccine contain a small amount of egg protein.  If you ever had Guillain-Barré Syndrome (also called GBS). Some people with a history of GBS should not get this vaccine. This should be discussed with your doctor.  If you are not feeling well. It is usually okay to get flu vaccine when you have a mild illness, but you might be asked to come back when you feel better. 4. Risks of a vaccine reaction With any medicine, including vaccines, there is a chance of reactions. These are usually mild and go away on their own, but serious reactions are also possible. Most people who get a flu shot do not have any problems with it. Minor problems following a flu shot include:  
 soreness, redness, or swelling where the shot was given  hoarseness  sore, red or itchy eyes  cough  fever  aches  headache  itching  fatigue If these problems occur, they usually begin soon after the shot and last 1 or 2 days. More serious problems following a flu shot can include the following:  There may be a small increased risk of Guillain-Barré Syndrome (GBS) after inactivated flu vaccine. This risk has been estimated at 1 or 2 additional cases per million people vaccinated. This is much lower than the risk of severe complications from flu, which can be prevented by flu vaccine.  Young children who get the flu shot along with pneumococcal vaccine (PCV13) and/or DTaP vaccine at the same time might be slightly more likely to have a seizure caused by fever. Ask your doctor for more information. Tell your doctor if a child who is getting flu vaccine has ever had a seizure. Problems that could happen after any injected vaccine:  People sometimes faint after a medical procedure, including vaccination. Sitting or lying down for about 15 minutes can help prevent fainting, and injuries caused by a fall. Tell your doctor if you feel dizzy, or have vision changes or ringing in the ears.  Some people get severe pain in the shoulder and have difficulty moving the arm where a shot was given. This happens very rarely.  Any medication can cause a severe allergic reaction. Such reactions from a vaccine are very rare, estimated at about 1 in a million doses, and would happen within a few minutes to a few hours after the vaccination. As with any medicine, there is a very remote chance of a vaccine causing a serious injury or death. The safety of vaccines is always being monitored. For more information, visit: www.cdc.gov/vaccinesafety/ 
 
 
6. The National Vaccine Injury Compensation Program 
 
The [facility] Vaccine Injury Compensation Program (VICP) is a federal program that was created to compensate people who may have been injured by certain vaccines. Persons who believe they may have been injured by a vaccine can learn about the program and about filing a claim by calling 7-787.108.9779 or visiting the [address] [employer] website at www.[facility].gov/vaccinecompensation. There is a time limit to file a claim for compensation. 7. How can I learn more?  Ask your healthcare provider. He or she can give you the vaccine package insert or suggest other sources of information.  Call your local or state health department.  Contact the Centers for Disease Control and Prevention (CDC): 
- Call 3-820.793.6569 (1-800-CDC-INFO) or 
- Visit CDCs website at www.cdc.gov/flu Vaccine Information Statement Inactivated Influenza Vaccine 8/7/2015 
42 ASHISH Franco 276RM-26 Department of Health and [employer] Centers for Disease Control and Prevention Office Use Only Upper Respiratory Infection (Cold): Care Instructions Your Care Instructions An upper respiratory infection, or URI, is an infection of the nose, sinuses, or throat. URIs are spread by coughs, sneezes, and direct contact. The common cold is the most frequent kind of URI. The flu and sinus infections are other kinds of URIs. Almost all URIs are caused by viruses. Antibiotics won't cure them. But you can treat most infections with home care. This may include drinking lots of fluids and taking over-the-counter pain medicine. You will probably feel better in 4 to 10 days. The doctor has checked you carefully, but problems can develop later. If you notice any problems or new symptoms, get medical treatment right away. Follow-up care is a key part of your treatment and safety. Be sure to make and go to all appointments, and call your doctor if you are having problems. It's also a good idea to know your test results and keep a list of the medicines you take. How can you care for yourself at home? · To prevent dehydration, drink plenty of fluids, enough so that your urine is light yellow or clear like water. Choose water and other caffeine-free clear liquids until you feel better. If you have kidney, heart, or liver disease and have to limit fluids, talk with your doctor before you increase the amount of fluids you drink. · Take an over-the-counter pain medicine, such as acetaminophen (Tylenol), ibuprofen (Advil, Motrin), or naproxen (Aleve). Read and follow all instructions on the label. · Before you use cough and cold medicines, check the label. These medicines may not be safe for young children or for people with certain health problems. · Be careful when taking over-the-counter cold or flu medicines and Tylenol at the same time. Many of these medicines have acetaminophen, which is Tylenol. Read the labels to make sure that you are not taking more than the recommended dose. Too much acetaminophen (Tylenol) can be harmful. · Get plenty of rest. 
· Do not smoke or allow others to smoke around you. If you need help quitting, talk to your doctor about stop-smoking programs and medicines. These can increase your chances of quitting for good. When should you call for help? Call 911 anytime you think you may need emergency care. For example, call if: 
? · You have severe trouble breathing. ?Call your doctor now or seek immediate medical care if: 
? · You seem to be getting much sicker. ? · You have new or worse trouble breathing. ? · You have a new or higher fever. ? · You have a new rash. ? Watch closely for changes in your health, and be sure to contact your doctor if: 
? · You have a new symptom, such as a sore throat, an earache, or sinus pain. ? · You cough more deeply or more often, especially if you notice more mucus or a change in the color of your mucus. ? · You do not get better as expected. Where can you learn more? Go to http://marky-jeovanny.info/. Enter T615 in the search box to learn more about \"Upper Respiratory Infection (Cold): Care Instructions. \" Current as of: May 12, 2017 Content Version: 11.4 © 2235-1095 [employer]. Care instructions adapted under license by [employer] (which disclaims liability or warranty for this information). If you have questions about a medical condition or this instruction, always ask your healthcare professional. [place] [address] any warranty or liability for your use of this information. Introducing [facility]! Dear Lawanda Rubinstein: Thank you for requesting a Nautal account. Our records indicate that you already have an active Nautal account. You can access your account anytime at [email or web address] Did you know that you can access your hospital and ER discharge instructions at any time in Nautal? You can also review all of your test results from your hospital stay or ER visit. Additional Information If you have questions, please visit the Frequently Asked Questions section of the Nautal website at [email or web address] Remember, Nautal is NOT to be used for urgent needs. For medical emergencies, dial 911. Now available from your iPhone and Android! Please provide this summary of care documentation to your next provider. Your primary care clinician is listed as Terry Gracia. If you have any questions after today's visit, please call [phone].

## 2017-11-08 NOTE — PROGRESS NOTES
Tyesha Herrera is a 40 y.o. female (: 1980) presenting to address:nasal congestion, headache    Chief Complaint   Patient presents with    Nasal Congestion     x 12 hours    Headache       Vitals:    17 1525   BP: 147/82   Pulse: (!) 108   Resp: 18   Temp: 98.2 °F (36.8 °C)   TempSrc: Oral   SpO2: 100%   Weight: 185 lb (83.9 kg)   Height: 5' 5\" (1.651 m)   PainSc:   7   PainLoc: Head   LMP: 10/18/2017       Hearing/Vision:   No exam data present    Learning Assessment:     Learning Assessment 2016   PRIMARY LEARNER Patient   HIGHEST LEVEL OF EDUCATION - PRIMARY LEARNER  4 YEARS OF COLLEGE   BARRIERS PRIMARY LEARNER NONE   CO-LEARNER CAREGIVER No   PRIMARY LANGUAGE ENGLISH    NEED No   LEARNER PREFERENCE PRIMARY READING   LEARNING SPECIAL TOPICS none   ANSWERED BY patient   RELATIONSHIP SELF   ASSESSMENT COMMENT n/a     Depression Screening:     PHQ over the last two weeks 2017   Little interest or pleasure in doing things Not at all   Feeling down, depressed or hopeless Not at all   Total Score PHQ 2 0     Fall Risk Assessment:   No flowsheet data found. Abuse Screening:     Abuse Screening Questionnaire 2016   Do you ever feel afraid of your partner? N   Are you in a relationship with someone who physically or mentally threatens you? N   Is it safe for you to go home? Y     Coordination of Care Questionaire:   1. Have you been to the ER, urgent care clinic since your last visit? Hospitalized since your last visit? no    2. Have you seen or consulted any other health care providers outside of the [address] [place] [address] since your last visit? Include any pap smears or colon screening. no    Advanced Directive:   1. Do you have an Advanced Directive? no    2. Would you like information on Advanced Directives? No    Patient would like to receive flu vaccine.

## 2017-11-09 NOTE — PROGRESS NOTES
Subjective:      Dalila Woo is a 40 y.o. female who presents for evaluation of possible sinus infection. Symptoms include nasal congestion and post nasal drip with no fever, chills, or night sweats. Onset of symptoms was 5 days ago, unchanged since that time. She is drinking plenty of fluids. .  Past history is significant for no history of pneumonia or bronchitis. Patient is a non-smoker. Patient here today requesting antibiotics. Past Medical History:   Diagnosis Date    Allergic rhinitis due to dogs     Allergic rhinitis due to pollen 6/28/2010    Seen by ENT, using OTC claritin and nasal steroid spray PRN    BMI 28.0-28.9,adult     Discoid lupus     Dr. Russel Roach- [facility]endoza, skin on face, chest, scalp and arms - blood tests every 6 months    Herpes zoster with ophthalmic complication     Followed by Dr. Freddy Garvey and referred to [facility] corneal scarring    History of gestational hypertension 2013    Hypertriglyceridemia 2015    Prediabetes     HbA1C 5.9% (2/2016)     Family History   Problem Relation Age of Onset    Diabetes Mother     Cancer Paternal Aunt      breast    Diabetes Maternal Grandmother     Diabetes Father      Adult onset (2016)     Current Outpatient Prescriptions   Medication Sig Dispense Refill    clobetasol (TEMOVATE) 0.05 % topical cream Apply  to affected area two (2) times daily as needed. 15 g 2    valACYclovir (VALTREX) 500 mg tablet Take 1,000 mg by mouth three (3) times daily.  levonorgestrel (MIRENA) 20 mcg/24 hr (5 years) IUD 1 Each by IntraUTERine route once. Placed 9/1/16      ergocalciferol (ERGOCALCIFEROL) 50,000 unit capsule TAKE 1 CAP BY MOUTH EVERY SEVEN (7) DAYS  DAYS. 12 Cap 0    mometasone (NASONEX) 50 mcg/actuation nasal spray 2 Sprays by Both Nostrils route daily.  1 Container 5     Allergies   Allergen Reactions    Sulfa (Sulfonamide Antibiotics) Other (comments)     Social History     Social History    Marital status:      Spouse name: N/A    Number of children: N/A    Years of education: N/A     Occupational History    Not on file. Social History Main Topics    Smoking status: Never Smoker    Smokeless tobacco: Never Used    Alcohol use No    Drug use: No    Sexual activity: Yes     Partners: Male     Birth control/ protection: Pill     Other Topics Concern    Not on file     Social History Narrative    Mother of 3, formerly worked as . Review of Systems  A comprehensive review of systems was negative except for that written in the HPI. Objective:     Visit Vitals    /82 (BP 1 Location: Left arm, BP Patient Position: Sitting)    Pulse (!) 108    Temp 98.2 °F (36.8 °C) (Oral)    Resp 18    Ht 5' 5\" (1.651 m)    Wt 185 lb (83.9 kg)    LMP 10/18/2017    SpO2 100%    BMI 30.79 kg/m2     General:  alert, cooperative, no distress, appears stated age   Head:  NCAT w/o lesions or tenderness   Eyes: negative   Ears: normal TM's and external ear canals AU   Sinus tender: minimal   Mouth:  Lips, mucosa, and tongue normal. Teeth and gums normal   Neck: supple, symmetrical, trachea midline, no adenopathy, thyroid: not enlarged, symmetric, no tenderness/mass/nodules, no carotid bruit and no JVD. Lungs: clear to auscultation bilaterally        Sinus film - wnl    Assessment:     Acute viral sinusitis    Plan:     1. Claritin D  2. flonase may aid in drying PND  3. Nasal saline rinses as needed for congestion.   4. Follow-up with PCP as needed if continued concern

## 2017-12-09 PROBLEM — E55.9 VITAMIN D DEFICIENCY: Chronic | Status: ACTIVE | Noted: 2017-12-09

## 2017-12-09 NOTE — PROGRESS NOTES
PRE-VISIT PLANNING:     Future Appointments  Date Time Provider Prasanna Mukherjee   2017 8:00 AM Villa Corbett MD Kimberly Martinez [address] (PCP is Villa Corbett MD) is scheduled for an office visit with Villa Corbett MD on 2017 for follow up. Encounter opened prior to visit to complete pre-visit planning, update and review pertinent sections in the chart. Last office visit with PCP was 17 Visit date not found. Rooming Nurse Items:  -- Release for last Pap smear report    Pending items for provider to review with patient:  -- Last labs 17 - A1C increased to 6.2%  Health Maintenance Due   Topic Date Due    PAP AKA CERVICAL CYTOLOGY  2017          Internal Medicine  Progress Note  [address] at [place] [address] Josefa Urrutia, [facility], [facility]  Phone [phone]; Fax [phone]    Date of Service:  2017  Patient's Name: Oralia Crocker   Patient's :  1980   Patient's Age:  40 y.o. Patient's Gender:  female     HPI:     Chief Complaint   Patient presents with    Anxiety        Oralia Crocker presents for follow up. She  has a past medical history of Allergic rhinitis due to dogs; Allergic rhinitis due to pollen (2010); BMI 28.0-28.9,adult; Discoid lupus; FABY (generalized anxiety disorder); Herpes zoster with ophthalmic complication; History of gestational hypertension (); Hypertriglyceridemia (); Influenza vaccination declined by patient (2016); Prediabetes; and Vitamin D deficiency (2017). Had flu shot 17. Fasting today, would like to update labs. Worsening anxiety, lifelong, worse after each of her 3 pregnancies. Never treated with meds, has always been able to manage using CBT techniques, but now is negatively impacting daily functioning. Has been very anxious about persistent allergy symptoms/drainage, sinus and ear pain and headaches.   Notes symptoms improved significantly after she changed back to a full dental mouth guard at nighttime for hx of jaw clenching. Has appt with [facility] tomorrow for 2nd opinion. Patient Active Problem List    Diagnosis    Vitamin D deficiency     High dose weekly Vitamin D - normal Vit D 7/13/17      Hypertriglyceridemia     Resolved on labs 7/13/17      Allergic rhinitis due to dogs        Managed by ENT, seeking 2nd opinion    Prediabetes     Not on medication; 7/13/17 HbA1C 6.2%. Weight trending up in past year.  Discoid lupus     Previously followed by [facility] derm Dr. Delano Foreman - recommended DUYEN check yearly through PCP's office. DUYEN neg 7/13/17      Allergic rhinitis due to pollen       Managed by ENT, seeking 2nd opinion   Body mass index is 31.62 kg/(m^2). Weight has increased 16#s since February 2017. Eating poorly, partly from anxiety increasing her intake of comfort foods.         Review of Systems (positives in bold)   General ROS:  chills, fatigue, fever, hot flashes, malaise, night sweats, sleep disturbance, weight gain, weight loss  Neurological ROS: behavioral changes, bowel and bladder control changes, confusion, dizziness, gait disturbance, headaches, impaired coordination/balance, memory loss, numbness/tingling, seizures, tremors, visual changes, focal weakness  Ophthalmic ROS: blurry vision, decreased vision, double vision, dry eyes, excessive tearing, eye pain, itchy eyes, loss of vision, photophobia, scotomata, uses contacts or glasses  ENT ROS: epistaxis, hearing change, nasal congestion, oral lesions, sinus pain, sneezing, sore throat, tinnitus, vertigo, visual changes or vocal changes  Cardiovascular ROS: chest pain, dyspnea on exertion, edema, irregular heartbeat, loss of consciousness, murmur, orthopnea, palpitations, paroxysmal nocturnal dyspnea, rapid heart rate, shortness of breath  Respiratory ROS: cough, hemoptysis, orthopnea, pleuritic pain, shortness of breath, sputum changes, stridor, tachypnea or wheezing  Gastrointestinal ROS: abdominal pain, appetite loss, blood in stools, change in bowel habits, constipation, diarrhea, gas/bloating, heartburn, hematemesis, melena, nausea/vomiting, stool incontinence, swallowing difficulty/pain, early satiety  Endocrine ROS: breast changes, galactorrhea, hair pattern changes, hot flashes, malaise/lethargy, mood swings, palpitations, polydipsia/polyuria, temperature intolerance, unexplained weight changes  Dermatological ROS: acne, dry skin, eczema, hair changes, lumps, mole changes, nail changes, pruritus, rash, skin lesions (discoid lupus)  Musculoskeletal ROS: gait disturbance, joint pain, joint stiffness, joint swelling, muscle pain, muscular weakness, joint buckling/instability, joint triggering  Hematological and Lymphatic ROS: bleeding problems, blood clots, bruising, fatigue, jaundice, night sweats, swollen lymph nodes, unexplained weight loss  Allergy and Immunology ROS: hives, itchy/watery eyes, nasal congestion, postnasal drip, seasonal allergies  Psychological ROS: anxiety, behavioral disorder, concentration difficulties, decreased libido, depression, disorientation, hallucinations, irritability, mood swings, obsessive thoughts, sleep disturbances, suicidal ideation or homicidal ideation        Assessment/Plan:       Keli Lynn was seen today for anxiety, chronic but worsening slowly over a period of years. Discussed treatment options including SSRI therapy vs Buspar with or without CBT. She would like to proceed with trial of Buspar as initial treatment and will consider resuming CBT if not improving adequately. Discussed common anxiety based framing errors. Update fasting labs. Work on weight loss through lifestyle modifications. Age/gender appropriate preventive topics and HM due reviewed with patient. Body mass index is 31.62 kg/(m^2). Discussed the patient's BMI with her.   Achieving/maintaining healthy weight/BMI recommended. Follow up BMI/weight at next visit. Patient instructions:  Labs today     Start buspar twice daily and call my office in 1 month if you're feeling better, but think the dose just needs to come up. If you aren't feeling better after a month, call and schedule an appointment. Follow up with Brittani Shannon MD in 6 months for prediabetes, DUYEN screening, cholesterol (30m). Complete fasting labs 1-2 weeks prior to the appointment. Please arrive 15 minutes prior to your scheduled appointment time. Call and request an earlier appointment if you have any new questions or concerns. Diagnoses and all orders for this visit:    1. Generalized anxiety disorder  -     TSH AND FREE T4; Future    2. Prediabetes  -     METABOLIC PANEL, COMPREHENSIVE; Future  -     HEMOGLOBIN A1C WITH EAG; Future    3. Discoid lupus  -     METABOLIC PANEL, COMPREHENSIVE; Future  -     DUYEN QL, W/REFLEX CASCADE; Future    4. Hypertriglyceridemia  -     METABOLIC PANEL, COMPREHENSIVE; Future  -     LIPID PANEL; Future    5. Vitamin D deficiency  -     VITAMIN D, 25 HYDROXY; Future    6. Screening for diabetes mellitus  -     HEMOGLOBIN A1C WITH EAG; Future    7. Screening for blood or protein in urine  -     URINALYSIS W/ RFLX MICROSCOPIC; Future    8. Screening for endocrine, metabolic and immunity disorder  -     busPIRone (BUSPAR) 5 mg tablet; Take 1 Tab by mouth two (2) times a day. -     CBC WITH AUTOMATED DIFF; Future  -     METABOLIC PANEL, COMPREHENSIVE; Future  -     HEMOGLOBIN A1C WITH EAG; Future  -     LIPID PANEL; Future  -     TSH AND FREE T4; Future  -     URINALYSIS W/ RFLX MICROSCOPIC; Future  -     VITAMIN D, 25 HYDROXY; Future  -     DUYEN QL, W/REFLEX CASCADE; Future    9. FABY (generalized anxiety disorder)    10. BMI 31.0-31.9,adult      The patient states understanding of recommended treatment plan.       Brittani Shannon MD - Internal Medicine  12/11/2017, 11:28 AM    Patient Care Team:  Serafin Ibarra MD as PCP - General (Internal Medicine)  [facility] as Consulting Provider (Obstetrics & Gynecology)  [facility] as Consulting Provider (Ophthalmology)  Russell Hardy MD as Consulting Provider (Ophthalmology)         Objective:       VS:    Visit Vitals    /82    Pulse (!) 111    Temp 98.2 °F (36.8 °C) (Oral)    Resp 20    Ht 5' 5\" (1.651 m)    Wt 190 lb (86.2 kg)    SpO2 98%    BMI 31.62 kg/m2       General:   Overweight, anxious, but otw well-appearing female seated comfortably ,         Well-nourished, well-groomed, conversant, alert, in no acute distress.      Psych:  Affect anxious, but interactive and making good eye contact, no psychomotor agitation, facies and mood are congruent,     behavior and conversation are appropriate, thought process linear and logical     Memory appears to be normal throughout interview  Head:  Normocephalic, atraumatic  Ears:  External ears WNL, no pain with manipulation of pinna or palpation of mastoids;      EACs patent; TMs are cloudy b/l but with no bulging, or erythema, no medial effusions present  Eyes:  EOMI, PERRL, no pain with eye movements    No conjunctival injection, abnormal tearing, discharge, chemosis  Mouth:  MMM with erythema and cobblestone appearance of posterior o/p with clear mucus drainage present,     No membranes, exudates, ulcerations or petechiae  Nose:  External nasal structures WNL, nares patent b/l  Neck:  Neck supple with normal ROM for age    No thyromegaly or nodules, no LAD  Cardiovasc:   Regular rate and rhythm, no murmurs, no rubs, no gallops  Pulmonary:   Clear breath sounds bilaterally, good air movement,    No wheezing, no rales, no rhonchi, normal respiratory effort  Abdomen:   Abdomen soft, nontender, nondistended, NABS, no masses  Extremities:   No pitting dependent edema    No tenderness with palpation of calves    Warm and well-perfused at distal extremities  Neuro:   Alert, conversant, following commands, no focal deficits. Ambulating with narrow based, stable gait without use of assistive device  Skin:    Warm, dry, normal pallor, scattered red inflamed patches with crusty/scaling appearance on face/nasal bridge, forearms      Additional History     Past Medical History:   Diagnosis Date    Allergic rhinitis due to dogs     Allergic rhinitis due to pollen 6/28/2010    Seen by ENT, using OTC claritin and nasal steroid spray PRN    BMI 28.0-28.9,adult     Discoid lupus     Dr. Jeremiah Rocha, skin on face, chest, scalp and arms - blood tests every 6 months    FABY (generalized anxiety disorder)     Lifelong, did CBT in the past, worsened after each pregnancy.  Herpes zoster with ophthalmic complication     Followed by Dr. Kishan Bright and referred to [facility] corneal scarring    History of gestational hypertension 2013    Hypertriglyceridemia 2015    Influenza vaccination declined by patient 2/1/2016    Prediabetes     HbA1C 5.9% (2/2016)    Vitamin D deficiency 12/9/2017    High dose weekly Vitamin D - normal Vit D 7/13/17     No past surgical history on file. Social History   Substance Use Topics    Smoking status: Never Smoker    Smokeless tobacco: Never Used    Alcohol use No     Current Outpatient Prescriptions   Medication Sig    busPIRone (BUSPAR) 5 mg tablet Take 1 Tab by mouth two (2) times a day.  clobetasol (TEMOVATE) 0.05 % topical cream Apply  to affected area two (2) times daily as needed.  mometasone (NASONEX) 50 mcg/actuation nasal spray 2 Sprays by Both Nostrils route daily.  valACYclovir (VALTREX) 500 mg tablet Take 1,000 mg by mouth three (3) times daily.  levonorgestrel (MIRENA) 20 mcg/24 hr (5 years) IUD 1 Each by IntraUTERine route once. Placed 9/1/16    ergocalciferol (ERGOCALCIFEROL) 50,000 unit capsule TAKE 1 CAP BY MOUTH EVERY SEVEN (7) DAYS  DAYS.      No current facility-administered medications for this visit. Allergies   Allergen Reactions    Sulfa (Sulfonamide Antibiotics) Other (comments)       Encounter Diagnoses:     Encounter Diagnoses     ICD-10-CM ICD-9-CM   1. Generalized anxiety disorder F41.1 300.02   2. Prediabetes R73.03 790.29   3. Discoid lupus L93.0 695.4   4. Hypertriglyceridemia E78.1 272.1   5. Vitamin D deficiency E55.9 268.9   6. Screening for diabetes mellitus Z13.1 V77.1   7. Screening for blood or protein in urine Z13.89 V82.9   8. Screening for endocrine, metabolic and immunity disorder Z13.29 V77.99    Z13.228     Z13.0    9. FABY (generalized anxiety disorder) F41.1 300.02   10. BMI 31.0-31.9,adult Z68.31 V85.31            This document may have been created with the aid of dictation software.   Text may contain errors, particularly phonetic errors

## 2017-12-11 ENCOUNTER — OFFICE VISIT (OUTPATIENT)
Dept: FAMILY MEDICINE CLINIC | Age: 37
End: 2017-12-11

## 2017-12-11 VITALS
DIASTOLIC BLOOD PRESSURE: 82 MMHG | BODY MASS INDEX: 31.65 KG/M2 | OXYGEN SATURATION: 98 % | TEMPERATURE: 98.2 F | SYSTOLIC BLOOD PRESSURE: 110 MMHG | HEART RATE: 111 BPM | RESPIRATION RATE: 20 BRPM | WEIGHT: 190 LBS | HEIGHT: 65 IN

## 2017-12-11 DIAGNOSIS — Z13.89 SCREENING FOR BLOOD OR PROTEIN IN URINE: ICD-10-CM

## 2017-12-11 DIAGNOSIS — Z13.228 SCREENING FOR ENDOCRINE, METABOLIC AND IMMUNITY DISORDER: ICD-10-CM

## 2017-12-11 DIAGNOSIS — F41.1 GAD (GENERALIZED ANXIETY DISORDER): ICD-10-CM

## 2017-12-11 DIAGNOSIS — F41.1 GENERALIZED ANXIETY DISORDER: Primary | Chronic | ICD-10-CM

## 2017-12-11 DIAGNOSIS — R73.03 PREDIABETES: Chronic | ICD-10-CM

## 2017-12-11 DIAGNOSIS — E78.1 HYPERTRIGLYCERIDEMIA: Chronic | ICD-10-CM

## 2017-12-11 DIAGNOSIS — L93.0 DISCOID LUPUS: Chronic | ICD-10-CM

## 2017-12-11 DIAGNOSIS — Z13.0 SCREENING FOR ENDOCRINE, METABOLIC AND IMMUNITY DISORDER: ICD-10-CM

## 2017-12-11 DIAGNOSIS — Z13.1 SCREENING FOR DIABETES MELLITUS: ICD-10-CM

## 2017-12-11 DIAGNOSIS — Z13.29 SCREENING FOR ENDOCRINE, METABOLIC AND IMMUNITY DISORDER: ICD-10-CM

## 2017-12-11 DIAGNOSIS — E55.9 VITAMIN D DEFICIENCY: Chronic | ICD-10-CM

## 2017-12-11 RX ORDER — BUSPIRONE HYDROCHLORIDE 5 MG/1
5 TABLET ORAL 2 TIMES DAILY
Qty: 60 TAB | Refills: 2 | Status: SHIPPED | OUTPATIENT
Start: 2017-12-11

## 2017-12-11 NOTE — PROGRESS NOTES
Haven Reilly is a 40 y.o. female (: 1980) presenting to address:    Chief Complaint   Patient presents with    Anxiety       Vitals:    17 0802   BP: 110/90   Pulse: (!) 111   Resp: 20   Temp: 98.2 °F (36.8 °C)   TempSrc: Oral   SpO2: 98%   Weight: 190 lb (86.2 kg)   Height: 5' 5\" (1.651 m)   PainSc:   0 - No pain       Hearing/Vision:   No exam data present    Learning Assessment:     Learning Assessment 2016   PRIMARY LEARNER Patient   HIGHEST LEVEL OF EDUCATION - PRIMARY LEARNER  4 YEARS OF COLLEGE   BARRIERS PRIMARY LEARNER NONE   CO-LEARNER CAREGIVER No   PRIMARY LANGUAGE ENGLISH    NEED No   LEARNER PREFERENCE PRIMARY READING   LEARNING SPECIAL TOPICS none   ANSWERED BY patient   RELATIONSHIP SELF   ASSESSMENT COMMENT n/a     Depression Screening:     PHQ over the last two weeks 2017   Little interest or pleasure in doing things Not at all   Feeling down, depressed or hopeless Not at all   Total Score PHQ 2 0     Fall Risk Assessment:   No flowsheet data found. Abuse Screening:     Abuse Screening Questionnaire 2017   Do you ever feel afraid of your partner? N   Are you in a relationship with someone who physically or mentally threatens you? N   Is it safe for you to go home? Y     Coordination of Care Questionaire:   1. Have you been to the ER, urgent care clinic since your last visit? Hospitalized since your last visit? YES urgent care    2. Have you seen or consulted any other health care providers outside of the [address] [place] [address] since your last visit? Include any pap smears or colon screening. NO    Advanced Directive:   1. Do you have an Advanced Directive? NO    2. Would you like information on Advanced Directives?  YES

## 2017-12-11 NOTE — MR AVS SNAPSHOT
Visit Information Date & Time Provider Department Dept. Phone Encounter #  
 12/11/2017  8:00 AM Merced Godinez, Vazquez [facility] [phone] [id number] Upcoming Health Maintenance Date Due  
 PAP AKA CERVICAL CYTOLOGY 6/5/2017 DTaP/Tdap/Td series (2 - Td) 4/14/2021 Allergies as of 12/11/2017  Review Complete On: 12/11/2017 By: Pao Duarte LPN Severity Noted Reaction Type Reactions Sulfa (Sulfonamide Antibiotics)  06/24/2010    Other (comments) Current Immunizations  Reviewed on 11/8/2017 Name Date Influenza Vaccine (Quad) PF 11/8/2017  3:33 PM  
 Tdap 4/14/2011 Not reviewed this visit You Were Diagnosed With   
  
 Codes Comments Generalized anxiety disorder    -  Primary ICD-10-CM: F41.1 ICD-9-CM: 300.02 Prediabetes     ICD-10-CM: R73.03 
ICD-9-CM: 790.29 Discoid lupus     ICD-10-CM: L93.0 ICD-9-CM: 695.4 Hypertriglyceridemia     ICD-10-CM: E78.1 ICD-9-CM: 272.1 Vitamin D deficiency     ICD-10-CM: E55.9 ICD-9-CM: 268.9 Screening for diabetes mellitus     ICD-10-CM: Z13.1 ICD-9-CM: V77.1 Screening for blood or protein in urine     ICD-10-CM: Z13.89 ICD-9-CM: V82.9 Screening for endocrine, metabolic and immunity disorder     ICD-10-CM: Z13.29, Z13.228, Z13.0 ICD-9-CM: V77.99 Vitals BP Pulse Temp Resp Height(growth percentile) Weight(growth percentile) 110/90 (!) 111 98.2 °F (36.8 °C) (Oral) 20 5' 5\" (1.651 m) 190 lb (86.2 kg) SpO2 BMI OB Status Smoking Status 98% 31.62 kg/m2 IUD Never Smoker Vitals History BMI and BSA Data Body Mass Index Body Surface Area  
 31.62 kg/m 2 1.99 m 2 Preferred Pharmacy Pharmacy Name Phone [facility] [address] [place] [address] [address]. [phone] Your Updated Medication List  
  
   
This list is accurate as of: 12/11/17  8:34 AM.  Always use your most recent med list.  
  
  
  
  
 busPIRone 5 mg tablet Commonly known as:  BUSPAR Take 1 Tab by mouth two (2) times a day. clobetasol 0.05 % topical cream  
Commonly known as:  Alfornia Hussar Apply  to affected area two (2) times daily as needed. ergocalciferol 50,000 unit capsule Commonly known as:  ERGOCALCIFEROL  
TAKE 1 CAP BY MOUTH EVERY SEVEN (7) DAYS  DAYS. MIRENA 20 mcg/24 hr (5 years) Iud  
Generic drug:  levonorgestrel 1 Each by IntraUTERine route once. Placed 9/1/16  
  
 mometasone 50 mcg/actuation nasal spray Commonly known as:  NASONEX  
2 Sprays by Both Nostrils route daily. VALTREX 500 mg tablet Generic drug:  valACYclovir Take 1,000 mg by mouth three (3) times daily. Prescriptions Sent to Pharmacy Refills  
 busPIRone (BUSPAR) 5 mg tablet 2 Sig: Take 1 Tab by mouth two (2) times a day. Class: Normal  
 Pharmacy: [facility]/pharmacy #[id number].  #: [phone] Route: Oral  
  
To-Do List   
 12/11/2017 Lab:  DUYEN QL, W/REFLEX CASCADE   
  
 12/11/2017 Lab:  CBC WITH AUTOMATED DIFF   
  
 12/11/2017 Lab:  HEMOGLOBIN A1C WITH EAG   
  
 12/11/2017 Lab:  LIPID PANEL   
  
 12/11/2017 Lab:  METABOLIC PANEL, COMPREHENSIVE   
  
 12/11/2017 Lab:  TSH AND FREE T4   
  
 12/11/2017 Lab:  URINALYSIS W/ RFLX MICROSCOPIC   
  
 12/11/2017 Lab:  VITAMIN D, 25 HYDROXY Patient Instructions No visits with results within 2 Week(s) from this visit. Latest known visit with results is: 
 
Office Visit on 07/13/2017 Component Date Value Ref Range Status  VITAMIN D, 25-HYDROXY 07/13/2017 33.7  32.0 - 100.0 ng/mL Final  
 Glucose 07/13/2017 104* 65 - 99 mg/dL Final  
 BUN 07/13/2017 10  6 - 22 mg/dL Final  
 Creatinine 07/13/2017 0.5  0.5 - 1.2 mg/dL Final  
 Sodium 07/13/2017 136  133 - 145 mmol/L Final  
 Potassium 07/13/2017 4.2  3.5 - 5.5 mmol/L Final  
  Chloride 07/13/2017 96* 98 - 110 mmol/L Final  
 CO2 07/13/2017 23  20 - 32 mmol/L Final  
 AST (SGOT) 07/13/2017 10  10 - 37 U/L Final  
 ALT (SGPT) 07/13/2017 25  5 - 40 U/L Final  
 Alk. phosphatase 07/13/2017 60  25 - 115 U/L Final  
 Bilirubin, total 07/13/2017 0.6  0.2 - 1.2 mg/dL Final  
 Calcium 07/13/2017 8.9  8.4 - 10.5 mg/dL Final  
 Protein, total 07/13/2017 7.1  6.4 - 8.3 g/dL Final  
 Albumin 07/13/2017 4.3  3.5 - 5.0 g/dL Final  
 A-G Ratio 07/13/2017 1.5  1.1 - 2.6 ratio Final  
 Globulin 07/13/2017 2.8  2.0 - 4.0 g/dL Final  
 Anion gap 07/13/2017 17.0  mmol/L Final  
 Comment: Test includes Albumin, Alkaline Phosphatase, ALT, AST, BUN, Calcium, CO2, Chloride, Creatinine, Glucose, Potassium, Sodium, Total Bilirubin and Total 
Protein. Estimated GFR results are reported in mL/min/1.73 sq.m. by the MDRD equation. This eGFR is validated for stable chronic renal failure patients. This  
equation 
is unreliable in acute illness or patients with normal renal function.  GFRAA 07/13/2017 >60.0  >60.0 Final  
 GFRNA 07/13/2017 >60.0  >60.0 Final  
 
 
AFTER VISIT INSTRUCTIONS Labs today Start buspar twice daily and call my office in 1 month if you're feeling better, but think the dose just needs to come up. If you aren't feeling better after a month, call and schedule an appointment. Follow up with Talia Viera MD in 6 months for prediabetes, DUYEN screening, cholesterol (30m). Complete fasting labs 1-2 weeks prior to the appointment. Please arrive 15 minutes prior to your scheduled appointment time. Call and request an earlier appointment if you have any new questions or concerns. LAB HOURS AT [facility] Monday-Friday 7a-3p Closed on holidays TESTING RESULTS Results of tests performed in this office will be viewable through [facility] in 10-14 days. Please be patient when awaiting routine lab testing results. Testing completed outside of the [address] will not be viewable in [facility]. If you need assistance with accessing your [employer] account, you can call the [address] [place] [address] at #[phone]. STAY UP  [address] Please review the following recommendations and if you are not sure that your healthcare is up to date, ask your provider at your next scheduled office visit. -- Yearly preventive visits (sometimes called \"physicals\") are recommended for all adults. Medicare and Medicaid do not pay for physicals. Medicare covers a yearly wellness visit that differs in many ways from a traditional physical, but is an opportunity to make sure you are maximizing the preventive services that will be covered by Medicare. Each insurance carrier will have different regulations about what services may be covered, so be sure to talk with your insurance provider before scheduling a visit. -- Colon cancer screening is recommended for all patients 48 and older with either colonoscopy (interval will vary depending on results) or yearly stool testing.   
 
-- Mammogram is recommended every 2 years for women ages 36 to 76. -- Pap smear is recommended every 3-5 years for women aged 24 to 72, unless your cervix has been surgically removed for benign reasons. -- Flu shot is recommended every fall for adults of all ages. -- Prevnar 15 is recommended at the age of 72 for all adults. -- Pneumovax is recommended one year after Prevnar 13 for all adults, but earlier if you have a history of chronic health problems (including diabetes and asthma) or smoking. -- Tetanus boosters are recommended every 10 years for adults of all ages. Medicare and Medicaid do not cover tetanus as a preventive booster, but will pay for patients to receive a booster in the event of certain injuries. MEDICATION REFILLS -- Controlled substance refills must be obtained during a scheduled office visit with the provider. -- All other medication refills are to be requested by calling your pharmacy during regular office hours. Allow at least 2 business days for processing. Refills will not be provided by the after hours answering service/on call provider. HOLIDAY CLOSURES:  
 
The office is closed on six major holidays each year:  New Year's Day, July 4th, Memorial Day, Labor Day, Thanksgiving, and [place] Day. Lab and X-ray services are not available on those days. Introducing [facility]! Dear Boogie Geller: Thank you for requesting a [employer] account. Our records indicate that you already have an active [employer] account. You can access your account anytime at [email or web address] Did you know that you can access your hospital and ER discharge instructions at any time in [employer]? You can also review all of your test results from your hospital stay or ER visit. Additional Information If you have questions, please visit the Frequently Asked Questions section of the [employer] website at [email or web address] Remember, [employer] is NOT to be used for urgent needs. For medical emergencies, dial 911. Now available from your iPhone and Android! Please provide this summary of care documentation to your next provider. Your primary care clinician is listed as Alber Dominguez. If you have any questions after today's visit, please call [phone].

## 2017-12-11 NOTE — PATIENT INSTRUCTIONS
No visits with results within 2 Week(s) from this visit. Latest known visit with results is:    Office Visit on 07/13/2017   Component Date Value Ref Range Status    VITAMIN D, 25-HYDROXY 07/13/2017 33.7  32.0 - 100.0 ng/mL Final    Glucose 07/13/2017 104* 65 - 99 mg/dL Final    BUN 07/13/2017 10  6 - 22 mg/dL Final    Creatinine 07/13/2017 0.5  0.5 - 1.2 mg/dL Final    Sodium 07/13/2017 136  133 - 145 mmol/L Final    Potassium 07/13/2017 4.2  3.5 - 5.5 mmol/L Final    Chloride 07/13/2017 96* 98 - 110 mmol/L Final    CO2 07/13/2017 23  20 - 32 mmol/L Final    AST (SGOT) 07/13/2017 10  10 - 37 U/L Final    ALT (SGPT) 07/13/2017 25  5 - 40 U/L Final    Alk. phosphatase 07/13/2017 60  25 - 115 U/L Final    Bilirubin, total 07/13/2017 0.6  0.2 - 1.2 mg/dL Final    Calcium 07/13/2017 8.9  8.4 - 10.5 mg/dL Final    Protein, total 07/13/2017 7.1  6.4 - 8.3 g/dL Final    Albumin 07/13/2017 4.3  3.5 - 5.0 g/dL Final    A-G Ratio 07/13/2017 1.5  1.1 - 2.6 ratio Final    Globulin 07/13/2017 2.8  2.0 - 4.0 g/dL Final    Anion gap 07/13/2017 17.0  mmol/L Final    Comment: Test includes Albumin, Alkaline Phosphatase, ALT, AST, BUN, Calcium, CO2,  Chloride, Creatinine, Glucose, Potassium, Sodium, Total Bilirubin and Total  Protein. Estimated GFR results are reported in mL/min/1.73 sq.m. by the MDRD equation. This eGFR is validated for stable chronic renal failure patients. This   equation  is unreliable in acute illness or patients with normal renal function.  GFRAA 07/13/2017 >60.0  >60.0 Final    GFRNA 07/13/2017 >60.0  >60.0 Final       AFTER VISIT INSTRUCTIONS     Labs today     Start buspar twice daily and call my office in 1 month if you're feeling better, but think the dose just needs to come up. If you aren't feeling better after a month, call and schedule an appointment. Follow up with Rubio Castaneda MD in 6 months for prediabetes, DUYEN screening, cholesterol (30m).   Complete fasting labs 1-2 weeks prior to the appointment. Please arrive 15 minutes prior to your scheduled appointment time. Call and request an earlier appointment if you have any new questions or concerns. LAB HOURS AT [facility]     Monday-Friday 7a-3p  Closed on holidays    TESTING RESULTS     Results of tests performed in this office will be viewable through [facility] in 10-14 days. Please be patient when awaiting routine lab testing results. Testing completed outside of the [facility] will not be viewable in [facility]. If you need assistance with accessing your [employer] account, you can call the [address] [place] [address] [employer] at #[phone]. STAY UP TO DATE WITH YOUR PREVENTIVE HEALTH   Please review the following recommendations and if you are not sure that your healthcare is up to date, ask your provider at your next scheduled office visit. -- Yearly preventive visits (sometimes called \"physicals\") are recommended for all adults. Medicare and Medicaid do not pay for physicals. Medicare covers a yearly wellness visit that differs in many ways from a traditional physical, but is an opportunity to make sure you are maximizing the preventive services that will be covered by Medicare. Each insurance carrier will have different regulations about what services may be covered, so be sure to talk with your insurance provider before scheduling a visit. -- Colon cancer screening is recommended for all patients 48 and older with either colonoscopy (interval will vary depending on results) or yearly stool testing.      -- Mammogram is recommended every 2 years for women ages 36 to 76. -- Pap smear is recommended every 3-5 years for women aged 24 to 72, unless your cervix has been surgically removed for benign reasons. -- Flu shot is recommended every fall for adults of all ages. -- Prevnar 15 is recommended at the age of 72 for all adults.     -- Pneumovax is recommended one year after Prevnar 13 for all adults, but earlier if you have a history of chronic health problems (including diabetes and asthma) or smoking. -- Tetanus boosters are recommended every 10 years for adults of all ages. Medicare and Medicaid do not cover tetanus as a preventive booster, but will pay for patients to receive a booster in the event of certain injuries. MEDICATION REFILLS      -- Controlled substance refills must be obtained during a scheduled office visit with the provider. -- All other medication refills are to be requested by calling your pharmacy during regular office hours. Allow at least 2 business days for processing. Refills will not be provided by the after hours answering service/on call provider. HOLIDAY CLOSURES:     The office is closed on six major holidays each year:  New Year's Day, July 4th, Memorial Day, Labor Day, Thanksgiving, and [place] Day. Lab and X-ray services are not available on those days.

## 2017-12-12 LAB
25(OH)D3 SERPL-MCNC: 23.8 NG/ML (ref 32–100)
A-G RATIO,AGRAT: 1.4 RATIO (ref 1.1–2.6)
ABSOLUTE LYMPHOCYTE COUNT, 10803: 2.2 K/UL (ref 1–4.8)
ALBUMIN SERPL-MCNC: 4.3 G/DL (ref 3.5–5)
ALP SERPL-CCNC: 64 U/L (ref 25–115)
ALT SERPL-CCNC: 23 U/L (ref 5–40)
ANA SCREEN, 8017: NEGATIVE
ANION GAP SERPL CALC-SCNC: 14 MMOL/L
AST SERPL W P-5'-P-CCNC: 7 U/L (ref 10–37)
AVG GLU, 10930: 126 MG/DL (ref 91–123)
BASOPHILS # BLD: 0 K/UL (ref 0–0.2)
BASOPHILS NFR BLD: 1 % (ref 0–2)
BILIRUB SERPL-MCNC: 0.4 MG/DL (ref 0.2–1.2)
BILIRUB UR QL: NEGATIVE
BUN SERPL-MCNC: 8 MG/DL (ref 6–22)
CALCIUM SERPL-MCNC: 8.9 MG/DL (ref 8.4–10.5)
CHLORIDE SERPL-SCNC: 102 MMOL/L (ref 98–110)
CHOLEST SERPL-MCNC: 158 MG/DL (ref 110–200)
CO2 SERPL-SCNC: 24 MMOL/L (ref 20–32)
CREAT SERPL-MCNC: 0.6 MG/DL (ref 0.5–1.2)
EOSINOPHIL # BLD: 0.1 K/UL (ref 0–0.5)
EOSINOPHIL NFR BLD: 2 % (ref 0–6)
ERYTHROCYTE [DISTWIDTH] IN BLOOD BY AUTOMATED COUNT: 12.7 % (ref 10–16)
GFRAA, 66117: >60
GFRNA, 66118: >60
GLOBULIN,GLOB: 3 G/DL (ref 2–4)
GLUCOSE SERPL-MCNC: 106 MG/DL (ref 70–99)
GLUCOSE UR QL: NEGATIVE MG/DL
GRANULOCYTES,GRANS: 53 % (ref 40–75)
HBA1C MFR BLD HPLC: 6 % (ref 4.8–5.9)
HCT VFR BLD AUTO: 40.1 % (ref 35.1–46.5)
HDLC SERPL-MCNC: 37 MG/DL (ref 40–59)
HGB BLD-MCNC: 13.1 G/DL (ref 11.7–15.5)
HGB UR QL STRIP: NEGATIVE
KETONES UR QL STRIP.AUTO: NEGATIVE MG/DL
LDLC SERPL CALC-MCNC: 90 MG/DL (ref 50–99)
LEUKOCYTE ESTERASE: NEGATIVE
LYMPHOCYTES, LYMLT: 33 % (ref 27–45)
MCH RBC QN AUTO: 30 PG (ref 26–34)
MCHC RBC AUTO-ENTMCNC: 33 G/DL (ref 32–36)
MCV RBC AUTO: 91 FL (ref 80–95)
MONOCYTES # BLD: 0.8 K/UL (ref 0.1–0.9)
MONOCYTES NFR BLD: 12 % (ref 3–9)
NEUTROPHILS # BLD AUTO: 3.4 K/UL (ref 1.8–7.7)
NITRITE UR QL STRIP.AUTO: NEGATIVE
PH UR STRIP: 5 PH (ref 5–8)
PLATELET # BLD AUTO: 257 K/UL (ref 140–440)
PMV BLD AUTO: 10.1 FL (ref 6–10.8)
POTASSIUM SERPL-SCNC: 4.5 MMOL/L (ref 3.5–5.5)
PROT SERPL-MCNC: 7.3 G/DL (ref 6.4–8.3)
PROT UR QL STRIP: NEGATIVE MG/DL
RBC # BLD AUTO: 4.43 M/UL (ref 3.8–5.2)
SODIUM SERPL-SCNC: 140 MMOL/L (ref 133–145)
SP GR UR: 1.02 (ref 1–1.03)
T4 FREE SERPL-MCNC: 1.4 NG/DL (ref 0.9–1.8)
TRIGL SERPL-MCNC: 155 MG/DL (ref 40–149)
TSH SERPL DL<=0.005 MIU/L-ACNC: 1.52 MCU/ML (ref 0.27–4.2)
UROBILINOGEN UR STRIP-MCNC: <2 MG/DL
VLDLC SERPL CALC-MCNC: 31 MG/DL (ref 8–30)
WBC # BLD AUTO: 6.5 K/UL (ref 4–11)

## 2017-12-18 DIAGNOSIS — E55.9 VITAMIN D DEFICIENCY: Primary | Chronic | ICD-10-CM

## 2017-12-18 RX ORDER — CHOLECALCIFEROL (VITAMIN D3) 125 MCG
5000 CAPSULE ORAL DAILY
Qty: 365 CAP | Refills: 99 | COMMUNITY

## 2017-12-18 NOTE — PROGRESS NOTES
Please call Starling Red to let her know results/recommendations are available for review on [employer]. -- DUYEN screen was normal  -- Average blood sugar level improved (HbA1C was 6.2%, now down to 6%), still in prediabetic range  -- Vitamin D level is low at 23.8 (normal is ). Vitamin D is important for bone health as it directs your body to store calcium. Start over the counter daily vitamin D supplement (5000 international units daily). Recheck in 6-12 months. Otherwise labs look fine. Cholesterol with tiny elevation in triglycerides, healthy diet and exercise are only recommendations.

## 2018-04-02 ENCOUNTER — OFFICE VISIT (OUTPATIENT)
Dept: FAMILY MEDICINE CLINIC | Age: 38
End: 2018-04-02

## 2018-04-02 VITALS
TEMPERATURE: 97.1 F | DIASTOLIC BLOOD PRESSURE: 86 MMHG | HEART RATE: 97 BPM | SYSTOLIC BLOOD PRESSURE: 142 MMHG | RESPIRATION RATE: 18 BRPM | HEIGHT: 65 IN | BODY MASS INDEX: 32.69 KG/M2 | WEIGHT: 196.2 LBS | OXYGEN SATURATION: 100 %

## 2018-04-02 DIAGNOSIS — R73.03 PREDIABETES: Chronic | ICD-10-CM

## 2018-04-02 DIAGNOSIS — E55.9 VITAMIN D DEFICIENCY: Chronic | ICD-10-CM

## 2018-04-02 DIAGNOSIS — L93.0 DISCOID LUPUS: Chronic | ICD-10-CM

## 2018-04-02 DIAGNOSIS — J30.81 ALLERGIC RHINITIS DUE TO DOGS: Primary | ICD-10-CM

## 2018-04-02 DIAGNOSIS — F41.1 GAD (GENERALIZED ANXIETY DISORDER): ICD-10-CM

## 2018-04-02 NOTE — PROGRESS NOTES
Olya Mabry is a 41 y.o. female (: 1980) presenting to address:    Chief Complaint   Patient presents with    Anxiety       Vitals:    18 1300   BP: 155/79   Pulse: 97   Resp: 18   Temp: 97.1 °F (36.2 °C)   TempSrc: Oral   SpO2: 100%   Weight: 196 lb 3.2 oz (89 kg)   Height: 5' 5\" (1.651 m)   PainSc:   0 - No pain   LMP: 2018       Hearing/Vision:   No exam data present    Learning Assessment:     Learning Assessment 2016   PRIMARY LEARNER Patient   HIGHEST LEVEL OF EDUCATION - PRIMARY LEARNER  4 YEARS OF COLLEGE   BARRIERS PRIMARY LEARNER NONE   CO-LEARNER CAREGIVER No   PRIMARY LANGUAGE ENGLISH    NEED No   LEARNER PREFERENCE PRIMARY READING   LEARNING SPECIAL TOPICS none   ANSWERED BY patient   RELATIONSHIP SELF   ASSESSMENT COMMENT n/a     Depression Screening:     PHQ over the last two weeks 2017   Little interest or pleasure in doing things Not at all   Feeling down, depressed or hopeless Not at all   Total Score PHQ 2 0     Fall Risk Assessment:   No flowsheet data found. Abuse Screening:     Abuse Screening Questionnaire 2017   Do you ever feel afraid of your partner? N   Are you in a relationship with someone who physically or mentally threatens you? N   Is it safe for you to go home? Y     Coordination of Care Questionaire:   1. Have you been to the ER, urgent care clinic since your last visit? Hospitalized since your last visit? NO    2. Have you seen or consulted any other health care providers outside of the [address] [place] [address] since your last visit? Include any pap smears or colon screening. YES    Advanced Directive:   1. Do you have an Advanced Directive? NO    2. Would you like information on Advanced Directives?  YES

## 2018-04-02 NOTE — MR AVS SNAPSHOT
[address] [place] [address] 
 
 
 [address] [facility] [address] [phone] Patient: Mary Park MRN: [id number] [facility]:0/4/6217 Visit Information Date & Time Provider Department Dept. Phone Encounter #  
 4/2/2018  1:00 PM Nathaniel Oglesby, 3 [facility] [phone] [id number] Your Appointments 6/11/2018  9:30 AM  
Follow Up with Nathaniel Oglesby MD  
3 [facility] Marie ) Appt Note: 6 month f/u appt [address] [facility] [address] [phone]  
  
   
 [address] Upcoming Health Maintenance Date Due  
 PAP AKA CERVICAL CYTOLOGY 9/14/2020 DTaP/Tdap/Td series (2 - Td) 4/14/2021 Allergies as of 4/2/2018  Review Complete On: 12/11/2017 By: Nathaniel Oglesby MD  
  
 Severity Noted Reaction Type Reactions Sulfa (Sulfonamide Antibiotics)  06/24/2010    Other (comments) Current Immunizations  Reviewed on 11/8/2017 Name Date Influenza Vaccine (Quad) PF 11/8/2017  3:33 PM  
 Tdap 4/14/2011 Not reviewed this visit You Were Diagnosed With   
  
 Codes Comments Allergic rhinitis due to dogs    -  Primary ICD-10-CM: J30.81 ICD-9-CM: 477.2 FABY (generalized anxiety disorder)     ICD-10-CM: F41.1 ICD-9-CM: 300.02 Vitamin D deficiency     ICD-10-CM: E55.9 ICD-9-CM: 268.9 Discoid lupus     ICD-10-CM: L93.0 ICD-9-CM: 695.4 Prediabetes     ICD-10-CM: R73.03 
ICD-9-CM: 790.29 Vitals BP Pulse Temp Resp Height(growth percentile) Weight(growth percentile) 142/86 97 97.1 °F (36.2 °C) (Oral) 18 5' 5\" (1.651 m) 196 lb 3.2 oz (89 kg) LMP SpO2 BMI OB Status Smoking Status 03/29/2018 100% 32.65 kg/m2 IUD Never Smoker Vitals History BMI and BSA Data Body Mass Index Body Surface Area  
 32.65 kg/m 2 2.02 m 2 Preferred Pharmacy Pharmacy Name Phone [facility] [email or web address] [phone] Your Updated Medication List  
  
   
This list is accurate as of 4/2/18  1:28 PM.  Always use your most recent med list.  
  
  
  
  
 busPIRone 5 mg tablet Commonly known as:  BUSPAR Take 1 Tab by mouth two (2) times a day. cholecalciferol 5,000 unit capsule Commonly known as:  VITAMIN D3 Take 1 Cap by mouth daily. clobetasol 0.05 % topical cream  
Commonly known as:  Enrigue Rumpf Apply  to affected area two (2) times daily as needed. MIRENA 20 mcg/24 hr (5 years) Iud  
Generic drug:  levonorgestrel 1 Each by IntraUTERine route once. Placed 9/1/16  
  
 mometasone 50 mcg/actuation nasal spray Commonly known as:  NASONEX  
2 Sprays by Both Nostrils route daily. VALTREX 500 mg tablet Generic drug:  valACYclovir Take 1,000 mg by mouth three (3) times daily. To-Do List   
 06/01/2018 Lab:  DUYEN QL, W/REFLEX CASCADE   
  
 06/01/2018 Lab:  HEMOGLOBIN A1C WITH EAG   
  
 06/01/2018 Lab:  VITAMIN D, 25 HYDROXY Patient Instructions Try reducing your morning dose of buspar to 2.5 mg (half a tablet) and continue the 5 mg dosing in the afternoon/evening. Recheck your blood pressure in 2-3 weeks. Schedule a follow up with dermatology. Labs in June (orders are in place) Introducing [facility]! Dear Pao Culver: Thank you for requesting a [employer] account. Our records indicate that you already have an active [employer] account. You can access your account anytime at [email or web address] Did you know that you can access your hospital and ER discharge instructions at any time in [employer]? You can also review all of your test results from your hospital stay or ER visit. Additional Information If you have questions, please visit the Frequently Asked Questions section of the [employer] website at [email or web address] Remember, [employer] is NOT to be used for urgent needs. For medical emergencies, dial 911. Now available from your iPhone and Android! Please provide this summary of care documentation to your next provider. Your primary care clinician is listed as Roro Stephenson. If you have any questions after today's visit, please call [phone].

## 2018-04-02 NOTE — PROGRESS NOTES
PRE-VISIT PLANNING:       Future Appointments  Date Time Provider Prasanna Mukherjee   2018 1:00 PM Best Valdez MD [facility] [address] [place] [address] (PCP is Best Valdez MD) is scheduled for an office visit with Best Valdez MD on 2018 for follow up. Encounter opened prior to visit to complete pre-visit planning, update and review pertinent sections in the chart. Last office visit with PCP was 2017. Rooming Nurse Items:      Pending items for provider to review with patient:    There are no preventive care reminders to display for this patient. Internal Medicine  Follow Up Note  [address] [facility] [place] [facility] [place]  [address], [facility] [place], [address]  Phone [phone]; Fax [phone]    Date of Service:  2018  Patient's Name & : Lorena Lazaro - 1980     Assessment/Plan/Orders:       Lorena Lazaro is a pleasant 41 y.o. female who was seen today for follow up. The primary encounter diagnosis was Allergic rhinitis due to dogs. Diagnoses of FABY (generalized anxiety disorder), Vitamin D deficiency, Discoid lupus, Prediabetes, and BMI 32.0-32.9,adult were also pertinent to this visit. Anxiety has improved, but buspar has been causing \"brain fog\" - morning dosing adjusted  BP mildly elevated today, patient will recheck BP in 2-3 weeks (NV)  Schedule follow up with derm for discoid lupus patch on nose that is no longer responding to topical clobetasol - okay to use occlusal dressing on dorsal right hand with clobetasol, but not on face without derm approval   Labs due in   HM recommendations reviewed with patient. Body mass index is 32.65 kg/(m^2). Discussed achieving/maintaining healthy weight and BMI. Follow up BMI/weight at next visit. The patient states understanding of recommended treatment plan.     Orders Placed This Encounter    VITAMIN D, 25 HYDROXY    DUYEN QL, W/REFLEX CASCADE    HEMOGLOBIN A1C WITH EAG       Patient instructions:  (see AVS)  Try reducing your morning dose of buspar to 2.5 mg (half a tablet) and continue the 5 mg dosing in the afternoon/evening. Recheck your blood pressure in 2-3 weeks. Schedule a follow up with dermatology. Labs in June (orders are in place)      Blayne Jacinto MD - Internal Medicine  4/3/2018, 9:22 AM    HPI & ROS:     Chief Complaint   Patient presents with   Mojgan Mackey presents for follow up. She  has a past medical history of Allergic rhinitis due to dogs; Allergic rhinitis due to pollen (6/28/2010); BMI 28.0-28.9,adult; Discoid lupus; FABY (generalized anxiety disorder); Herpes zoster with ophthalmic complication; History of gestational hypertension (2013); Hypertriglyceridemia (2015); Influenza vaccination declined by patient (2/1/2016); Prediabetes; and Vitamin D deficiency (12/9/2017). Body mass index is 32.65 kg/(m^2). Weight is up 6#s since last visit, up 25#s since 8/2016. Discoid lupus has been more active and now has a couple spots that are not responding to topical clobetasol in spite of patient taking care to apply clobetasol ointment at nighttime and avoid wiping off ointment. One spot is on her nose and is particularly bothersome - itchy, scaly. The other is on dorsal right hand. Anxiety has gotten a lot better, started buspar 1 month ago (was scared to start it at first), but medication is causing some \"brain fog\"/mental clouding and dizziness. Dizziness has improved with taking it without food, but \"brain fog\" is bothersome. Has been taking 5 mg PO BID consistently for 4 weeks now. Seen by TMJ specialist, thought OTC occlusal guard might be causing tongue occluding airway. Will be getting a custom molded occlusal guard and starting neuromuscular txs. Will be getting a daytime guard also as she clenches even during the day. Discussed weight gain and current BMI.   Doesn't drink caffeine, starting Weight Watchers to lose weight. Patient Active Problem List    Diagnosis    BMI 32.0-32.9,adult     4/2/2018:  Ht 5' 5\" (1.651 m)  Wt 196 lb 3.2 oz (89 kg)  BMI 32.65 kg/m2  -- Starting weight watchers         FABY (generalized anxiety disorder)     Lifelong, did CBT in the past, worsened after each pregnancy. 3/2018:  Started trial of buspar 5 mg PO daily, still noting [employer] [place] [employer] fog\" after 4 weeks  4/2018:  Try reducing buspar to 2.5 mg in AM and 5 mg in PM      Vitamin D deficiency     High dose weekly Vitamin D - normal Vit D 7/13/17      Hypertriglyceridemia     Resolved on labs 7/13/17      Allergic rhinitis due to dogs    Prediabetes     Not on medication; 7/13/17 HbA1C 6.2%      Discoid lupus     Previously followed by [facility] derm Dr. Ainsley Mccormack - recommended DUYEN check yearly through PCP's office. Worsens with UV exposure  4/2018:  Patches on nose and dorsal right hand not responding to clobetasol ointment (previously had worked well)      Allergic rhinitis due to pollen     Review of Systems   Constitutional: Negative for chills, diaphoresis, fever, malaise/fatigue and weight loss. +Weight gain   Eyes: Negative for blurred vision. Cardiovascular: Negative for chest pain, palpitations, orthopnea, claudication, leg swelling and PND. Genitourinary: Negative for flank pain and hematuria. Skin: Positive for itching and rash. Neurological: Negative for dizziness, tingling, tremors, sensory change, speech change, focal weakness, seizures, loss of consciousness, weakness and headaches. Psychiatric/Behavioral: Negative for depression, hallucinations, memory loss, substance abuse and suicidal ideas. The patient is nervous/anxious and has insomnia.             Objective:     /86  Pulse 97  Temp 97.1 °F (36.2 °C) (Oral)   Resp 18  Ht 5' 5\" (1.651 m)  Wt 196 lb 3.2 oz (89 kg)  LMP 03/29/2018  SpO2 100%  BMI 32.65 kg/m2    Physical Exam   Constitutional: She is oriented to person, place, and time. She appears well-developed and well-nourished. No distress. HENT:   Head: Normocephalic and atraumatic. Right Ear: External ear normal.   Left Ear: External ear normal.   Nose: Nose normal.   Mouth/Throat: Oropharynx is clear and moist. No oropharyngeal exudate. Eyes: Conjunctivae and EOM are normal. Right eye exhibits no discharge. Left eye exhibits no discharge. No scleral icterus. Neck: Neck supple. Cardiovascular: Normal rate, regular rhythm, normal heart sounds and intact distal pulses. Pulmonary/Chest: Effort normal and breath sounds normal. No respiratory distress. Musculoskeletal:        Arms:  Neurological: She is alert and oriented to person, place, and time. She exhibits normal muscle tone. Coordination normal.   Skin: Skin is warm and dry. She is not diaphoretic. Erythematous nummular plaques with silvery/white scaling on bridge of nose, forehead and dorsal right hand and on scalp. Scattered areas of erythematous scarring on bilateral dorsal forearms and forehead. Psychiatric: She has a normal mood and affect. Her behavior is normal. Judgment and thought content normal.         Additional History     Past Medical History:   Diagnosis Date    Allergic rhinitis due to dogs     Allergic rhinitis due to pollen 6/28/2010    Seen by ENT, using OTC claritin and nasal steroid spray PRN    BMI 28.0-28.9,adult     Discoid lupus     Dr. Swapna Hahn- [facility], skin on face, chest, scalp and arms - blood tests every 6 months    FABY (generalized anxiety disorder)     Lifelong, did CBT in the past, worsened after each pregnancy.      Herpes zoster with ophthalmic complication     Followed by Dr. Rodo Gomes and referred to [facility] for corneal scarring    History of gestational hypertension 2013    Hypertriglyceridemia 2015    Influenza vaccination declined by patient 2/1/2016    Prediabetes     HbA1C 5.9% (2/2016)    Vitamin D deficiency 12/9/2017    High dose weekly Vitamin D - normal Vit D 7/13/17     No past surgical history on file. Social History   Substance Use Topics    Smoking status: Never Smoker    Smokeless tobacco: Never Used    Alcohol use No     Current Outpatient Prescriptions   Medication Sig    cholecalciferol (VITAMIN D3) 5,000 unit capsule Take 1 Cap by mouth daily.  busPIRone (BUSPAR) 5 mg tablet Take 1 Tab by mouth two (2) times a day.  valACYclovir (VALTREX) 500 mg tablet Take 1,000 mg by mouth three (3) times daily.  levonorgestrel (MIRENA) 20 mcg/24 hr (5 years) IUD 1 Each by IntraUTERine route once. Placed 9/1/16    clobetasol (TEMOVATE) 0.05 % topical cream Apply  to affected area two (2) times daily as needed.  mometasone (NASONEX) 50 mcg/actuation nasal spray 2 Sprays by Both Nostrils route daily. No current facility-administered medications for this visit. Allergies   Allergen Reactions    Sulfa (Sulfonamide Antibiotics) Other (comments)       Encounter Diagnoses:     Encounter Diagnoses     ICD-10-CM ICD-9-CM   1. Allergic rhinitis due to dogs J30.81 477.2   2. FABY (generalized anxiety disorder) F41.1 300.02   3. Vitamin D deficiency E55.9 268.9   4. Discoid lupus L93.0 695.4   5. Prediabetes R73.03 790.29   6. BMI 32.0-32.9,adult Z68.32 V85.32          This document may have been created with the aid of dictation software.   Text may contain errors, particularly phonetic errors

## 2018-04-02 NOTE — PATIENT INSTRUCTIONS
Try reducing your morning dose of buspar to 2.5 mg (half a tablet) and continue the 5 mg dosing in the afternoon/evening. Recheck your blood pressure in 2-3 weeks. Schedule a follow up with dermatology.      Labs in June (orders are in place)

## 2018-04-02 NOTE — Clinical Note
Fax note to [facility] dermatology (patient used to see Dr. Lianna Bradley, but I believe she left the area)

## 2018-06-01 DIAGNOSIS — E55.9 VITAMIN D DEFICIENCY: Chronic | ICD-10-CM

## 2018-06-01 DIAGNOSIS — L93.0 DISCOID LUPUS: Chronic | ICD-10-CM

## 2018-06-01 DIAGNOSIS — R73.03 PREDIABETES: Chronic | ICD-10-CM

## 2018-06-10 NOTE — PROGRESS NOTES
PRE-VISIT PLANNING:     Future Appointments  Date Time Provider Prasanna Mukherjee   2018 9:30 AM Chucho Rosales MD Kimberly Martinez [address] (PCP is Chucho Rosales MD) is scheduled for an office visit with Chucho Rosales MD on 2018 for follow up. Encounter opened prior to visit to complete pre-visit planning, update and review pertinent sections in the chart. Last office visit with PCP was 2018. Discussed reducing buspar AM dosing due to \"brain fog\" at last visit. Rooming Nurse Items:  --     Pending items for provider to review with patient:  -- Lab orders are in place, awaiting completion by patient  There are no preventive care reminders to display for this patient. Internal Medicine  Follow Up Note  [address] at [facility]  [address], [facility] [place], [facility], [address]  Phone [phone]; Fax [phone]    Date of Service:  2018  Patient's Name & : Pee Lea - 1980     Assessment/Plan/Orders:       Pee Lea is a pleasant 41 y.o. female who was seen today for follow up. The primary encounter diagnosis was FABY (generalized anxiety disorder). Diagnoses of Discoid lupus, Vitamin D deficiency, Hypertriglyceridemia, Prediabetes, BMI 30.0-30.9,adult, and Elevated blood pressure reading in office with white coat syndrome, without diagnosis of hypertension were also pertinent to this visit. Labs today to check DUYEN, Vit D and A1C  BP is high in the office again, but most likely due to anxiety/white coat, patient will get a home BP cuff for monitoring and send readings to me in about 1 month   Continue topical tx for discoid lupus  Doing well off anxiety medication with CBT/breathing/yoga  Weight is down 10#s. Ht 5' 5\" (1.651 m)  Wt 186 lb (84.4 kg)  SpO2 95%  BMI 30.95 kg/m2. Continue focus on lifestyle modifications.   Ideal body weight: 125 lb 10.6 oz (57 kg)  Adjusted ideal body weight: 149 lb 12.8 oz (67.9 kg). Follow up weight/BMI at next visit. The patient states understanding of recommended treatment plan. No orders of the defined types were placed in this encounter. Patient Instructions     Labs today     Monitor your blood pressure and return the form to me (you can attach PDF files to [employer] emails or drop it off in the office)    Follow up with Ronnie Pradhan MD in 6 months (30m). Arrive 15 minutes prior to scheduled appointment time for check-in. Call and request an earlier appointment if you have questions or concerns. A HEALTHY LIFESTYLE IS RECOMMENDED FOR EVERYONE! Your doctor recommends a lifestyle that incorporates daily exercise and a diet focused on whole, unprocessed foods. Aim to follow a diet of 2/3 non-starchy vegetables and low glycemic impact fruits and 1/3 lean proteins. Avoid processed/refined carbohydrates (especially bread products, bakery items, sugary drinks, cereals, crackers and sweets). Minimum 30 minutes of cardio and weight training/resistance exercise daily to build muscle, reduce insulin sensitivity and increase resting fat & calorie burning capacity. TESTING RESULTS   Normal results will be released to [employer] or sent by [address] mail. [address] [facility] #[phone]. Results of tests performed at outside facilities will not appear in [address] If you have questions about your results, please feel free to schedule a follow up appointment to discuss your concerns with your PCP. MEDICATION REFILLS      -- Medication refills should be requested at least 2 business days in advance through your pharmacy. Refills will not be provided by the after hours/on call provider. Ronnie Pradhan MD - Internal Medicine  6/11/2018, 7:47 PM      HPI & ROS:     Chief Complaint   Patient presents with    Cholesterol Problem         Cecily Bejarano presents for follow up. Stopped buspar b/c of brain fog symptoms.   Anxiety is there, but feels like she is recognizing anxiety symptoms better. Wants to continue off meds for now. Doing yoga, meditation, breathing techniques. Using a mindful/meditation alex. Has lost 10#s. Patient Active Problem List    Diagnosis    Elevated blood pressure reading in office with white coat syndrome, without diagnosis of hypertension    BMI 30.0-30.9,adult     4/2/2018:  Ht 5' 5\" (1.651 m)  Wt 196 lb 3.2 oz (89 kg)  BMI 32.65 kg/m2  -- Starting weight watchers         FABY (generalized anxiety disorder)     Lifelong, did CBT in the past, worsened after each pregnancy. 3/2018:  Started trial of buspar 5 mg PO daily, still noting [employer] fog\" after 4 weeks  4/2018:  Try reducing buspar to 2.5 mg in AM and 5 mg in PM  6/2018:  Patient stopped buspar due to [employer] fog\", feels she is doing well off meds       Vitamin D deficiency     High dose weekly Vitamin D - normal Vit D 7/13/17      Hypertriglyceridemia     Resolved on labs 7/13/17      Allergic rhinitis due to dogs    Prediabetes     Not on medication; 7/13/17 HbA1C 6.2%      Discoid lupus     Previously followed by [facility] derm Dr. Nelly Eagle - recommended DUYEN check yearly through PCP's office. Worsens with UV exposure  4/2018:  Patches on nose and dorsal right hand not responding to clobetasol ointment (previously had worked well)      Allergic rhinitis due to pollen     Review of Systems   Constitutional: Negative for chills, diaphoresis, fever, malaise/fatigue and weight loss. Eyes: Negative for blurred vision, double vision, photophobia, pain, discharge and redness. Respiratory: Negative for cough, hemoptysis, sputum production, shortness of breath and wheezing. Cardiovascular: Negative for chest pain, palpitations, orthopnea, claudication, leg swelling and PND. Gastrointestinal: Negative for abdominal pain, blood in stool, constipation, diarrhea, heartburn, melena, nausea and vomiting.    Musculoskeletal: Negative for back pain, falls, joint pain, myalgias and neck pain. Skin: Positive for itching and rash. Neurological: Negative for dizziness, tingling, tremors, sensory change, speech change, focal weakness, seizures, loss of consciousness, weakness and headaches. Psychiatric/Behavioral: The patient is nervous/anxious. Objective:     /70 (BP 1 Location: Left arm, BP Patient Position: Sitting)  Pulse (!) 111  Temp 98.4 °F (36.9 °C) (Oral)   Resp 20  Ht 5' 5\" (1.651 m)  Wt 186 lb (84.4 kg)  SpO2 95%  BMI 30.95 kg/m2    Physical Exam   Constitutional: She is oriented to person, place, and time. She appears well-developed and well-nourished. No distress. HENT:   Head: Normocephalic and atraumatic. Right Ear: External ear normal.   Left Ear: External ear normal.   Nose: Nose normal.   Mouth/Throat: Oropharynx is clear and moist. No oropharyngeal exudate. Eyes: Conjunctivae and EOM are normal. Right eye exhibits no discharge. Left eye exhibits no discharge. No scleral icterus. Neck: Neck supple. Cardiovascular: Normal rate, regular rhythm, normal heart sounds and intact distal pulses. Pulmonary/Chest: Effort normal and breath sounds normal. No respiratory distress. Neurological: She is alert and oriented to person, place, and time. She exhibits normal muscle tone. Coordination normal.   Skin: Skin is warm and dry. She is not diaphoretic. Erythematous nummular plaques with silvery/white scaling on bridge of nose, forehead and dorsal right hand, behind left ear, on scalp. Scattered areas of erythematous scarring on bilateral dorsal forearms and forehead. Psychiatric: Her speech is normal and behavior is normal. Judgment and thought content normal. Her mood appears anxious.  Cognition and memory are normal.        Additional History     Past Medical History:   Diagnosis Date    Allergic rhinitis due to dogs     Allergic rhinitis due to pollen 6/28/2010    Seen by ENT, using OTC claritin and nasal steroid spray PRN    BMI 28.0-28.9,adult     Discoid lupus     Dr. Teressa HASKINS, skin on face, chest, scalp and arms - blood tests every 6 months    FABY (generalized anxiety disorder)     Lifelong, did CBT in the past, worsened after each pregnancy.  Herpes zoster with ophthalmic complication     Followed by Dr. Beni Hathaway and referred to [facility] for corneal scarring    History of gestational hypertension 2013    Hypertriglyceridemia 2015    Influenza vaccination declined by patient 2/1/2016    Prediabetes     HbA1C 5.9% (2/2016)    Vitamin D deficiency 12/9/2017    High dose weekly Vitamin D - normal Vit D 7/13/17     No past surgical history on file. Social History   Substance Use Topics    Smoking status: Never Smoker    Smokeless tobacco: Never Used    Alcohol use No     Current Outpatient Prescriptions   Medication Sig    cholecalciferol (VITAMIN D3) 5,000 unit capsule Take 1 Cap by mouth daily.  clobetasol (TEMOVATE) 0.05 % topical cream Apply  to affected area two (2) times daily as needed.  valACYclovir (VALTREX) 500 mg tablet Take 1,000 mg by mouth three (3) times daily.  levonorgestrel (MIRENA) 20 mcg/24 hr (5 years) IUD 1 Each by IntraUTERine route once. Placed 9/1/16    busPIRone (BUSPAR) 5 mg tablet Take 1 Tab by mouth two (2) times a day.  mometasone (NASONEX) 50 mcg/actuation nasal spray 2 Sprays by Both Nostrils route daily. No current facility-administered medications for this visit. Allergies   Allergen Reactions    Sulfa (Sulfonamide Antibiotics) Other (comments)     Encounter Diagnoses:     Encounter Diagnoses     ICD-10-CM ICD-9-CM   1. FABY (generalized anxiety disorder) F41.1 300.02   2. Discoid lupus L93.0 695.4   3. Vitamin D deficiency E55.9 268.9   4. Hypertriglyceridemia E78.1 272.1   5. Prediabetes R73.03 790.29   6. BMI 30.0-30.9,adult Z68.30 V85.30   7.  Elevated blood pressure reading in office with white coat syndrome, without diagnosis of hypertension R03.0 796.2            This document may have been created with the aid of dictation software.   Text may contain errors, particularly phonetic errors

## 2018-06-11 ENCOUNTER — OFFICE VISIT (OUTPATIENT)
Dept: FAMILY MEDICINE CLINIC | Age: 38
End: 2018-06-11

## 2018-06-11 VITALS
WEIGHT: 186 LBS | OXYGEN SATURATION: 95 % | TEMPERATURE: 98.4 F | HEART RATE: 111 BPM | BODY MASS INDEX: 30.99 KG/M2 | RESPIRATION RATE: 20 BRPM | DIASTOLIC BLOOD PRESSURE: 70 MMHG | HEIGHT: 65 IN | SYSTOLIC BLOOD PRESSURE: 150 MMHG

## 2018-06-11 DIAGNOSIS — E78.1 HYPERTRIGLYCERIDEMIA: Chronic | ICD-10-CM

## 2018-06-11 DIAGNOSIS — F41.1 GAD (GENERALIZED ANXIETY DISORDER): Primary | Chronic | ICD-10-CM

## 2018-06-11 DIAGNOSIS — E55.9 VITAMIN D DEFICIENCY: Chronic | ICD-10-CM

## 2018-06-11 DIAGNOSIS — R73.03 PREDIABETES: Chronic | ICD-10-CM

## 2018-06-11 DIAGNOSIS — R03.0 ELEVATED BLOOD PRESSURE READING IN OFFICE WITH WHITE COAT SYNDROME, WITHOUT DIAGNOSIS OF HYPERTENSION: ICD-10-CM

## 2018-06-11 DIAGNOSIS — L93.0 DISCOID LUPUS: Chronic | ICD-10-CM

## 2018-06-11 NOTE — PATIENT INSTRUCTIONS
Labs today     Monitor your blood pressure and return the form to me (you can attach PDF files to [employer] emails or drop it off in the office)    Follow up with Sara Song MD in 6 months (30m). Arrive 15 minutes prior to scheduled appointment time for check-in. Call and request an earlier appointment if you have questions or concerns. A HEALTHY LIFESTYLE IS RECOMMENDED FOR EVERYONE! Your doctor recommends a lifestyle that incorporates daily exercise and a diet focused on whole, unprocessed foods. Aim to follow a diet of 2/3 non-starchy vegetables and low glycemic impact fruits and 1/3 lean proteins. Avoid processed/refined carbohydrates (especially bread products, bakery items, sugary drinks, cereals, crackers and sweets). Minimum 30 minutes of cardio and weight training/resistance exercise daily to build muscle, reduce insulin sensitivity and increase resting fat & calorie burning capacity. TESTING RESULTS   Normal results will be released to [employer] or sent by [address] mail. [address] [facility] #[phone]. Results of tests performed at outside facilities will not appear in [address] If you have questions about your results, please feel free to schedule a follow up appointment to discuss your concerns with your PCP. MEDICATION REFILLS      -- Medication refills should be requested at least 2 business days in advance through your pharmacy. Refills will not be provided by the after hours/on call provider.

## 2018-06-11 NOTE — PROGRESS NOTES
Racheal Dias is a 41 y.o. female (: 1980) presenting to address:    Chief Complaint   Patient presents with    Cholesterol Problem       Vitals:    18 0927   BP: 150/70   Pulse: (!) 111   Resp: 20   Temp: 98.4 °F (36.9 °C)   TempSrc: Oral   SpO2: 95%   Weight: 186 lb (84.4 kg)   Height: 5' 5\" (1.651 m)   PainSc:   0 - No pain       Hearing/Vision:   No exam data present    Learning Assessment:     Learning Assessment 2016   PRIMARY LEARNER Patient   HIGHEST LEVEL OF EDUCATION - PRIMARY LEARNER  4 YEARS OF COLLEGE   BARRIERS PRIMARY LEARNER NONE   CO-LEARNER CAREGIVER No   PRIMARY LANGUAGE ENGLISH    NEED No   LEARNER PREFERENCE PRIMARY READING   LEARNING SPECIAL TOPICS none   ANSWERED BY patient   RELATIONSHIP SELF   ASSESSMENT COMMENT n/a     Depression Screening:     PHQ over the last two weeks 2018   Little interest or pleasure in doing things Not at all   Feeling down, depressed or hopeless Not at all   Total Score PHQ 2 0     Fall Risk Assessment:   No flowsheet data found. Abuse Screening:     Abuse Screening Questionnaire 2018   Do you ever feel afraid of your partner? N   Are you in a relationship with someone who physically or mentally threatens you? N   Is it safe for you to go home? Y     Coordination of Care Questionaire:   1. Have you been to the ER, urgent care clinic since your last visit? Hospitalized since your last visit? NO    2. Have you seen or consulted any other health care providers outside of the [address] [place] [address] since your last visit? Include any pap smears or colon screening. NO    Advanced Directive:   1. Do you have an Advanced Directive? YES    2. Would you like information on Advanced Directives?  NO

## 2018-06-11 NOTE — MR AVS SNAPSHOT
[address] [place] [address] [address] [facility] [address] [phone] Patient: Socrates Foster MRN: [id number] QBP:0/9/2532 Visit Information Date & Time Provider Department Dept. Phone Encounter #  
 6/11/2018  9:30 AM Ashli Pinon Nelson [facility] Yuri [phone] [id number] Follow-up Instructions Return in about 6 months (around 12/11/2018). Upcoming Health Maintenance Date Due Influenza Age 5 to Adult 8/1/2018 PAP AKA CERVICAL CYTOLOGY 9/14/2020 DTaP/Tdap/Td series (2 - Td) 4/14/2021 Allergies as of 6/11/2018  Review Complete On: 6/11/2018 By: Ashli Pinon MD  
  
 Severity Noted Reaction Type Reactions Sulfa (Sulfonamide Antibiotics)  06/24/2010    Other (comments) Current Immunizations  Reviewed on 11/8/2017 Name Date Influenza Vaccine (Quad) PF 11/8/2017  3:33 PM  
 Tdap 4/14/2011 Not reviewed this visit You Were Diagnosed With   
  
 Codes Comments FABY (generalized anxiety disorder)    -  Primary ICD-10-CM: F41.1 ICD-9-CM: 300.02 Discoid lupus     ICD-10-CM: L93.0 ICD-9-CM: 695.4 Vitamin D deficiency     ICD-10-CM: E55.9 ICD-9-CM: 268.9 Hypertriglyceridemia     ICD-10-CM: E78.1 ICD-9-CM: 272.1 Prediabetes     ICD-10-CM: R73.03 
ICD-9-CM: 790.29 Vitals BP Pulse Temp Resp Height(growth percentile) Weight(growth percentile) 150/70 (BP 1 Location: Left arm, BP Patient Position: Sitting) (!) 111 98.4 °F (36.9 °C) (Oral) 20 5' 5\" (1.651 m) 186 lb (84.4 kg) SpO2 BMI OB Status Smoking Status 95% 30.95 kg/m2 IUD Never Smoker Vitals History BMI and BSA Data Body Mass Index Body Surface Area 30.95 kg/m 2 1.97 m 2 Preferred Pharmacy Pharmacy Name Phone [facility] [address]. [phone] Your Updated Medication List  
  
   
 This list is accurate as of 6/11/18  9:49 AM.  Always use your most recent med list.  
  
  
  
  
 busPIRone 5 mg tablet Commonly known as:  BUSPAR Take 1 Tab by mouth two (2) times a day. cholecalciferol 5,000 unit capsule Commonly known as:  VITAMIN D3 Take 1 Cap by mouth daily. clobetasol 0.05 % topical cream  
Commonly known as:  Coye Shear Apply  to affected area two (2) times daily as needed. MIRENA 20 mcg/24 hr (5 years) Iud  
Generic drug:  levonorgestrel 1 Each by IntraUTERine route once. Placed 9/1/16  
  
 mometasone 50 mcg/actuation nasal spray Commonly known as:  NASONEX  
2 Sprays by Both Nostrils route daily. VALTREX 500 mg tablet Generic drug:  valACYclovir Take 1,000 mg by mouth three (3) times daily. Follow-up Instructions Return in about 6 months (around 12/11/2018). Patient Instructions Labs today Monitor your blood pressure and return the form to me (you can attach PDF files to [employer] emails or drop it off in the office) Follow up with Farida Wilks MD in 6 months (30m). Arrive 15 minutes prior to scheduled appointment time for check-in. Call and request an earlier appointment if you have questions or concerns. A HEALTHY LIFESTYLE IS RECOMMENDED FOR EVERYONE! Your doctor recommends a lifestyle that incorporates daily exercise and a diet focused on whole, unprocessed foods. Aim to follow a diet of 2/3 non-starchy vegetables and low glycemic impact fruits and 1/3 lean proteins. Avoid processed/refined carbohydrates (especially bread products, bakery items, sugary drinks, cereals, crackers and sweets). Minimum 30 minutes of cardio and weight training/resistance exercise daily to build muscle, reduce insulin sensitivity and increase resting fat & calorie burning capacity. TESTING RESULTS Normal results will be released to [employer] or sent by [address] mail.    [employer] Help Desk #[phone]. Results of tests performed at outside facilities will not appear in [address] If you have questions about your results, please feel free to schedule a follow up appointment to discuss your concerns with your PCP. MEDICATION REFILLS   
 
-- Medication refills should be requested at least 2 business days in advance through your pharmacy. Refills will not be provided by the after hours/on call provider. Introducing [facility]! Dear Petra Miramontes: Thank you for requesting a [employer] account. Our records indicate that you already have an active [employer] account. You can access your account anytime at [email or web address] Did you know that you can access your hospital and ER discharge instructions at any time in [employer]? You can also review all of your test results from your hospital stay or ER visit. Additional Information If you have questions, please visit the Frequently Asked Questions section of the [employer] website at [email or web address] Remember, [employer] is NOT to be used for urgent needs. For medical emergencies, dial 911. Now available from your iPhone and Android! Please provide this summary of care documentation to your next provider. Your primary care clinician is listed as Lupe Marroquin. If you have any questions after today's visit, please call [phone].

## 2018-06-12 LAB
25(OH)D3 SERPL-MCNC: 33.7 NG/ML (ref 32–100)
ANA SCREEN, IFA: NEGATIVE
AVG GLU, 10930: 129 MG/DL (ref 91–123)
HBA1C MFR BLD HPLC: 6.1 % (ref 4.8–5.9)

## 2018-06-16 NOTE — PROGRESS NOTES
Average blood sugar has been stable over the past year, but was better in the past.  Continue to work on lifestyle changes, try to avoid processed carbs as much as possible.

## 2018-07-13 ENCOUNTER — TELEPHONE (OUTPATIENT)
Dept: FAMILY MEDICINE CLINIC | Age: 38
End: 2018-07-13